# Patient Record
Sex: MALE | Race: WHITE | Employment: UNEMPLOYED | URBAN - METROPOLITAN AREA
[De-identification: names, ages, dates, MRNs, and addresses within clinical notes are randomized per-mention and may not be internally consistent; named-entity substitution may affect disease eponyms.]

---

## 2017-01-05 ENCOUNTER — OFFICE VISIT (OUTPATIENT)
Dept: FAMILY MEDICINE CLINIC | Age: 32
End: 2017-01-05

## 2017-01-05 VITALS
SYSTOLIC BLOOD PRESSURE: 140 MMHG | BODY MASS INDEX: 23.03 KG/M2 | OXYGEN SATURATION: 100 % | DIASTOLIC BLOOD PRESSURE: 88 MMHG | WEIGHT: 170 LBS | HEIGHT: 72 IN | RESPIRATION RATE: 18 BRPM | HEART RATE: 95 BPM | TEMPERATURE: 97.6 F

## 2017-01-05 DIAGNOSIS — K21.00 GASTROESOPHAGEAL REFLUX DISEASE WITH ESOPHAGITIS: Primary | ICD-10-CM

## 2017-01-05 DIAGNOSIS — J01.00 ACUTE NON-RECURRENT MAXILLARY SINUSITIS: ICD-10-CM

## 2017-01-05 DIAGNOSIS — J40 BRONCHITIS: ICD-10-CM

## 2017-01-05 DIAGNOSIS — K52.9 COLITIS: ICD-10-CM

## 2017-01-05 RX ORDER — ALBUTEROL SULFATE 90 UG/1
2 AEROSOL, METERED RESPIRATORY (INHALATION)
Qty: 1 INHALER | Refills: 0 | Status: SHIPPED | OUTPATIENT
Start: 2017-01-05 | End: 2022-08-30

## 2017-01-05 RX ORDER — AZITHROMYCIN 250 MG/1
TABLET, FILM COATED ORAL
Qty: 6 TAB | Refills: 0 | Status: SHIPPED | OUTPATIENT
Start: 2017-01-05 | End: 2017-04-11 | Stop reason: ALTCHOICE

## 2017-01-05 RX ORDER — ALBUTEROL SULFATE 0.83 MG/ML
2.5 SOLUTION RESPIRATORY (INHALATION) ONCE
Qty: 1 EACH | Refills: 0
Start: 2017-01-05 | End: 2017-01-05

## 2017-01-05 RX ORDER — PANTOPRAZOLE SODIUM 40 MG/1
40 TABLET, DELAYED RELEASE ORAL 2 TIMES DAILY
Qty: 60 TAB | Refills: 0 | Status: SHIPPED | OUTPATIENT
Start: 2017-01-05 | End: 2021-11-01 | Stop reason: ALTCHOICE

## 2017-01-05 RX ORDER — RANITIDINE 300 MG/1
300 TABLET ORAL DAILY
Qty: 30 TAB | Refills: 5 | Status: SHIPPED | OUTPATIENT
Start: 2017-01-05 | End: 2021-11-01 | Stop reason: ALTCHOICE

## 2017-01-05 NOTE — PROGRESS NOTES
HISTORY OF PRESENT ILLNESS  Brennan Diego is a 28 y.o. male. Chief Complaint   Patient presents with   [facility] ED Follow-up     Admission to [facility] from 12-19-16 - 12-22-16 DX:COLITIS    Medication Refill       HPI  Pt here for f/u of hospital admission at [facility] from 12/19/16- 12/22/16. He presented with abd pain and vomiting. Sx did resolve shortly after admission. EGD revealed grade D esphagitis. Pt had been on bid ppi. He was told to start carafate, cont ppi, and f/u with gi. He will need repeat egd in one month. Pt did get the carafate. He has been taking it qac and qhs. He is having less abd pain with this. He did complete the cipro and flagyl already. Pt also dx with colitis during this admission. Pt reports that he has been feeling poorly lately. He has had a cough that is productive of yellow and green mucus that is blood tinged at times. He has had intermittent fevers and chills. He has had frontal ha's as well as sore throat. No ear pain. Review of Systems   Constitutional: Positive for chills and fever. HENT: Positive for congestion and sore throat. Respiratory: Positive for cough, sputum production and shortness of breath. Negative for wheezing. Cardiovascular: Negative. Neurological: Positive for headaches. All other systems reviewed and are negative. Physical Exam   Constitutional: He is oriented to person, place, and time. He appears well-developed and well-nourished. No distress. HENT:   Head: Normocephalic and atraumatic. Right Ear: Hearing, tympanic membrane, external ear and ear canal normal.   Left Ear: Hearing, tympanic membrane, external ear and ear canal normal.   Nose: Mucosal edema present. No rhinorrhea. Right sinus exhibits maxillary sinus tenderness. Right sinus exhibits no frontal sinus tenderness. Left sinus exhibits maxillary sinus tenderness. Left sinus exhibits no frontal sinus tenderness.    Mouth/Throat: Uvula is midline, oropharynx is clear and moist and mucous membranes are normal. No oropharyngeal exudate, posterior oropharyngeal edema or posterior oropharyngeal erythema. Eyes: Conjunctivae and EOM are normal.   Neck: Normal range of motion. Neck supple. No JVD present. No thyromegaly present. Cardiovascular: Normal rate, regular rhythm and normal heart sounds. Exam reveals no gallop and no friction rub. No murmur heard. Pulmonary/Chest: Effort normal. No accessory muscle usage. No respiratory distress. He has decreased breath sounds. He has no wheezes. He has no rhonchi. He has no rales. Musculoskeletal: Normal range of motion. Lymphadenopathy:     He has no cervical adenopathy. Neurological: He is alert and oriented to person, place, and time. Coordination normal.   Skin: Skin is warm and dry. Scattered excoriations on face   Psychiatric: He has a normal mood and affect. His behavior is normal. Judgment and thought content normal.   Nursing note and vitals reviewed. ASSESSMENT and Troy Pierre was seen today for ed follow-up and medication refill. Diagnoses and all orders for this visit:    Gastroesophageal reflux disease with esophagitis  -     pantoprazole (PROTONIX) 40 mg tablet; Take 1 Tab by mouth two (2) times a day. -     raNITIdine (ZANTAC) 300 mg tablet; Take 1 Tab by mouth daily.  -     REFERRAL TO GASTROENTEROLOGY  Cont carafate. Colitis  -     pantoprazole (PROTONIX) 40 mg tablet; Take 1 Tab by mouth two (2) times a day. -     raNITIdine (ZANTAC) 300 mg tablet; Take 1 Tab by mouth daily.  -     REFERRAL TO GASTROENTEROLOGY    Bronchitis  -     albuterol (PROVENTIL VENTOLIN) 2.5 mg /3 mL (0.083 %) nebulizer solution; 3 mL by Nebulization route once for 1 dose. -     ALBUTEROL, INHAL. OPI.()  -     INHAL RX, AIRWAY OBST/DX SPUTUM INDUCT ([id number]); Future  -     azithromycin (ZITHROMAX) 250 mg tablet;  Take 2 pills today, then one pill daily on days 2-5.  -     albuterol (PROVENTIL HFA, VENTOLIN HFA, PROAIR HFA) 90 mcg/actuation inhaler; Take 2 Puffs by inhalation every four (4) hours as needed for Wheezing. Acute non-recurrent maxillary sinusitis  -     azithromycin (ZITHROMAX) 250 mg tablet; Take 2 pills today, then one pill daily on days 2-5.       Follow-up Disposition:1 month; sooner prn

## 2017-01-05 NOTE — PROGRESS NOTES
Umair Paula 28 y.o. male   Chief Complaint   Patient presents with   [facility] ED Follow-up     Admission to [facility] from 12-19-16 - 12-22-16 DX:COLITIS    Medication Refill           1. Have you been to the ER, urgent care clinic since your last visit? Hospitalized since your last visit? Yes When: 12-19-16 THRU 12-22-16 Where: [facility] Reason for visit: COLITIS    2. Have you seen or consulted any other health care providers outside of the [facility] since your last visit? Include any pap smears or colon screening.  No

## 2017-04-11 ENCOUNTER — OFFICE VISIT (OUTPATIENT)
Dept: FAMILY MEDICINE CLINIC | Age: 32
End: 2017-04-11

## 2017-04-11 VITALS
HEART RATE: 103 BPM | OXYGEN SATURATION: 95 % | WEIGHT: 171 LBS | BODY MASS INDEX: 23.16 KG/M2 | SYSTOLIC BLOOD PRESSURE: 152 MMHG | HEIGHT: 72 IN | DIASTOLIC BLOOD PRESSURE: 102 MMHG | TEMPERATURE: 98.3 F

## 2017-04-11 DIAGNOSIS — I10 ESSENTIAL HYPERTENSION: ICD-10-CM

## 2017-04-11 DIAGNOSIS — G89.4 CHRONIC PAIN SYNDROME: ICD-10-CM

## 2017-04-11 DIAGNOSIS — M25.552 HIP PAIN, LEFT: Primary | ICD-10-CM

## 2017-04-11 RX ORDER — METOPROLOL SUCCINATE 100 MG/1
100 TABLET, EXTENDED RELEASE ORAL DAILY
Qty: 30 TAB | Refills: 5 | Status: SHIPPED | OUTPATIENT
Start: 2017-04-11 | End: 2021-11-01

## 2017-04-11 NOTE — MR AVS SNAPSHOT
Visit Information Date & Time Provider Department Dept. Phone Encounter #  
 4/11/2017  1:15 PM Yudelka Yanes NP [address] [id number] Follow-up Instructions Return in about 2 weeks (around 4/27/2017), or if symptoms worsen or fail to improve, for hip pain, bruising, blood pressure. Upcoming Health Maintenance Date Due Pneumococcal 19-64 Medium Risk (1 of 1 - PPSV23) 5/9/2005 DTaP/Tdap/Td series (1 - Tdap) 5/9/2007 Allergies as of 4/11/2017  Review Complete On: 4/11/2017 By: Yudelka Yanes NP Severity Noted Reaction Type Reactions Amoxicillin  03/25/2015    Other (comments) Demerol [Meperidine]  09/19/2013    Hives, Swelling Hydrocodone-acetaminophen  03/31/2015    Nausea Only Ketorolac  03/31/2015    Nausea Only Pcn [Penicillins]  09/19/2013    Hives Sulfa (Sulfonamide Antibiotics)  09/19/2013    Hives Tramadol  03/25/2015    Other (comments) Current Immunizations  Never Reviewed Name Date Influenza Vaccine 9/24/2016, 9/30/2012 Not reviewed this visit You Were Diagnosed With   
  
 Codes Comments Hip pain, left    -  Primary ICD-10-CM: M25.552 ICD-9-CM: 719.45 Essential hypertension     ICD-10-CM: I10 
ICD-9-CM: 401.9 Chronic pain syndrome     ICD-10-CM: G89.4 ICD-9-CM: 338. 4 Vitals BP Pulse Temp Height(growth percentile) Weight(growth percentile) SpO2  
 (!) 152/102 (BP 1 Location: Left arm, BP Patient Position: Sitting) (!) 103 98.3 °F (36.8 °C) (Oral) 6' (1.829 m) 171 lb (77.6 kg) 95% BMI Smoking Status 23.19 kg/m2 Current Every Day Smoker Vitals History BMI and BSA Data Body Mass Index Body Surface Area  
 23.19 kg/m 2 1.99 m 2 Preferred Pharmacy Pharmacy Name Phone [facility] [address] [phone] Your Updated Medication List  
  
   
 This list is accurate as of: 4/11/17  2:12 PM.  Always use your most recent med list.  
  
  
  
  
 albuterol 90 mcg/actuation inhaler Commonly known as:  PROVENTIL HFA, VENTOLIN HFA, PROAIR HFA Take 2 Puffs by inhalation every four (4) hours as needed for Wheezing. CARAFATE 100 mg/mL suspension Generic drug:  sucralfate Take  by mouth four (4) times daily. Take 10 mL by Mouth 4 Times a Day Before Meals & at Bedtime. Cholecalciferol (Vitamin D3) 2,000 unit Cap capsule Commonly known as:  VITAMIN D3 Take 2,000 Units by mouth daily. cyclobenzaprine 10 mg tablet Commonly known as:  FLEXERIL  
  
 fluticasone 50 mcg/actuation nasal spray Commonly known as:  Brad Noss 2 Sprays by Both Nostrils route daily. LYRICA 75 mg capsule Generic drug:  pregabalin  
  
 metoprolol succinate 100 mg tablet Commonly known as:  TOPROL-XL Take 1 Tab by mouth daily. * oxyCODONE ER 20 mg ER tablet Commonly known as:  OxyCONTIN  
  
 * oxyCODONE IR 15 mg immediate release tablet Commonly known as:  OXY-IR  
  
 pantoprazole 40 mg tablet Commonly known as:  PROTONIX Take 1 Tab by mouth two (2) times a day. raNITIdine 300 mg tablet Commonly known as:  ZANTAC Take 1 Tab by mouth daily. ZyrTEC 10 mg tablet Generic drug:  cetirizine Take  by mouth daily. * Notice: This list has 2 medication(s) that are the same as other medications prescribed for you. Read the directions carefully, and ask your doctor or other care provider to review them with you. Prescriptions Sent to Pharmacy Refills  
 metoprolol succinate (TOPROL-XL) 100 mg tablet 5 Sig: Take 1 Tab by mouth daily. Class: Normal  
 Pharmacy: [address] [place] [address], [address] [place] [address] #: [phone] Route: Oral  
  
We Performed the Following REFERRAL TO ORTHOPEDICS [[id number] Custom] Comments: Please evaluate and treat patient for chronic hip pain (two months). Patient under pain management for back pain. Follow-up Instructions Return in about 2 weeks (around 4/27/2017), or if symptoms worsen or fail to improve, for hip pain, bruising, blood pressure. Referral Information Referral ID Referred By Referred To  
  
 [id number] Julee Spencer, [address] [facility] [address]   
   [address], [address] Phone: [phone] Fax: [phone] Visits Status Start Date End Date 1 New Request 4/11/17 4/11/18 If your referral has a status of pending review or denied, additional information will be sent to support the outcome of this decision. Patient Instructions Hip Pain: Care Instructions Your Care Instructions Hip pain may be caused by many things, including overuse, a fall, or a twisting movement. Another cause of hip pain is arthritis. Your pain may increase when you stand up, walk, or squat. The pain may come and go or may be constant. Home treatment can help relieve hip pain, swelling, and stiffness. If your pain is ongoing, you may need more tests and treatment. Follow-up care is a key part of your treatment and safety. Be sure to make and go to all appointments, and call your doctor if you are having problems. Its also a good idea to know your test results and keep a list of the medicines you take. How can you care for yourself at home? · Take pain medicines exactly as directed. ¨ If the doctor gave you a prescription medicine for pain, take it as prescribed. ¨ If you are not taking a prescription pain medicine, ask your doctor if you can take an over-the-counter medicine. · Rest and protect your hip. Take a break from any activity, including standing or walking, that may cause pain. · Put ice or a cold pack against your hip for 10 to 20 minutes at a time. Try to do this every 1 to 2 hours for the next 3 days (when you are awake) or until the swelling goes down. Put a thin cloth between the ice and your skin. · Sleep on your healthy side with a pillow between your knees, or sleep on your back with pillows under your knees. · If there is no swelling, you can put moist heat, a heating pad, or a warm cloth on your hip. Do gentle stretching exercises to help keep your hip flexible. · Learn how to prevent falls. Have your vision and hearing checked regularly. Wear slippers or shoes with a nonskid sole. · Stay at a healthy weight. · Wear comfortable shoes. When should you call for help? Call 911 anytime you think you may need emergency care. For example, call if: 
· You have sudden chest pain and shortness of breath, or you cough up blood. · You are not able to stand or walk or bear weight. · Your buttocks, legs, or feet feel numb or tingly. · Your leg or foot is cool or pale or changes color. · You have severe pain. Call your doctor now or seek immediate medical care if: 
· You have signs of infection, such as: 
¨ Increased pain, swelling, warmth, or redness in the hip area. ¨ Red streaks leading from the hip area. ¨ Pus draining from the hip area. ¨ A fever. · You have signs of a blood clot, such as: 
¨ Pain in your calf, back of the knee, thigh, or groin. ¨ Redness and swelling in your leg or groin. · You are not able to bend, straighten, or move your leg normally. · You have trouble urinating or having bowel movements. Watch closely for changes in your health, and be sure to contact your doctor if: 
· You do not get better as expected. Where can you learn more? Go to http://roosevelt-angelica.info/. Enter U270 in the search box to learn more about \"Hip Pain: Care Instructions. \" Current as of: May 27, 2016 Content Version: 11.2 © 8234-7404 [employer].  Care instructions adapted under license by [address] (which disclaims liability or warranty for this information). If you have questions about a medical condition or this instruction, always ask your healthcare professional. [address] any warranty or liability for your use of this information. Introducing [facility]! Dina Marino introduces [employer] patient portal. Now you can access parts of your medical record, email your doctor's office, and request medication refills online. 1. In your internet browser, go to [email or web address] 2. Click on the First Time User? Click Here link in the Sign In box. You will see the New Member Sign Up page. 3. Enter your [employer] Access Code exactly as it appears below. You will not need to use this code after youve completed the sign-up process. If you do not sign up before the expiration date, you must request a new code. · [employer] Access Code: [email or web address] Expires: 7/10/2017 12:55 PM 
 
4. Enter the last four digits of your Social Security Number (xxxx) and Date of Birth (mm/dd/yyyy) as indicated and click Submit. You will be taken to the next sign-up page. 5. Create a [employer] ID. This will be your [employer] login ID and cannot be changed, so think of one that is secure and easy to remember. 6. Create a [employer] password. You can change your password at any time. 7. Enter your Password Reset Question and Answer. This can be used at a later time if you forget your password. 8. Enter your e-mail address. You will receive e-mail notification when new information is available in [address] 9. Click Sign Up. You can now view and download portions of your medical record. 10. Click the Download Summary menu link to download a portable copy of your medical information. If you have questions, please visit the Frequently Asked Questions section of the [employer] website.  Remember, [employer] is NOT to be used for urgent needs. For medical emergencies, dial 911. Now available from your iPhone and Android! Please provide this summary of care documentation to your next provider. Your primary care clinician is listed as Ernesto Barlow. If you have any questions after today's visit, please call [phone].

## 2017-04-11 NOTE — PROGRESS NOTES
HPI  Junaid Zapata is a 28 y.o. male  Chief Complaint   Patient presents with    Other     Pt has brusing and tenderness of left hip and lower back. Pt states that he has been having pain for over 2 months. Pt states that he did not mitali anything to injure himself. Requesting blood pressure medication refill as he is out and has been out for two weeks. Denies chest pain and or shortness of breath. Denies dizziness. Rates hernandez pain on left side 6/10. Denies injury or trauma. Reports pain and bruising has been present for two months. Reports pain is daily but occurs inconsistently. Reports bruising has been red without change. Belt resting at site of bruising. Denies hurting or harming himself. Denies anyone else has hurt or harmed him. Reports normal ambulation with cane which has been unchanged since noticing left hip bruising. Reports sitting increases pain as this has been the case with his pain since his back injury. Reports being under pain management for chronic pain due to severe back injury but states he does not see them till the end of the month. Reports he has pain medication from pain management but states it does not help with the break thru pain. Past Medical History  Past Medical History:   Diagnosis Date    Acid indigestion     ADHD (attention deficit hyperactivity disorder)     Asthma     Back pain     Heartburn     Herpes     HTN (hypertension)     IBS (irritable bowel syndrome)     Joint pain     Muscle pain     Muscle weakness     Trouble in sleeping        Surgical History  Past Surgical History:   Procedure Laterality Date    HX BACK SURGERY  2013    HX ORTHOPAEDIC      RT HAND        Medications  Current Outpatient Prescriptions   Medication Sig Dispense Refill    metoprolol succinate (TOPROL-XL) 100 mg tablet Take 1 Tab by mouth daily. 30 Tab 5    pantoprazole (PROTONIX) 40 mg tablet Take 1 Tab by mouth two (2) times a day.  60 Tab 0    raNITIdine (ZANTAC) 300 mg tablet Take 1 Tab by mouth daily. 30 Tab 5    albuterol (PROVENTIL HFA, VENTOLIN HFA, PROAIR HFA) 90 mcg/actuation inhaler Take 2 Puffs by inhalation every four (4) hours as needed for Wheezing. 1 Inhaler 0    sucralfate (CARAFATE) 100 mg/mL suspension Take  by mouth four (4) times daily. Take 10 mL by Mouth 4 Times a Day Before Meals & at Bedtime.  Cholecalciferol, Vitamin D3, (VITAMIN D3) 2,000 unit cap capsule Take 2,000 Units by mouth daily. 30 Cap 0    LYRICA 75 mg capsule   0    oxyCODONE IR (OXY-IR) 15 mg immediate release tablet       oxyCODONE ER (OXYCONTIN) 20 mg ER tablet   0    cyclobenzaprine (FLEXERIL) 10 mg tablet   0    cetirizine (ZYRTEC) 10 mg tablet Take  by mouth daily.  fluticasone (FLONASE) 50 mcg/actuation nasal spray 2 Sprays by Both Nostrils route daily. 1 Bottle 5       Allergies  Allergies   Allergen Reactions    Amoxicillin Other (comments)    Demerol [Meperidine] Hives and Swelling    Hydrocodone-Acetaminophen Nausea Only    Ketorolac Nausea Only    Pcn [Penicillins] Hives    Sulfa (Sulfonamide Antibiotics) Hives    Tramadol Other (comments)       Family History  Family History   Problem Relation Age of Onset    Cancer Other      GRANDMOTHER, AND AUNT    Diabetes Other      GREAT GRANDFATHER    Heart Disease Other      AUNT    Hypertension Other      GRANDMOTHER, GRANDFATHER, MOTHER    Stroke Other     Immunodeficiency Other      ALL FAMILY MEMBERS       Social History  Social History     Social History    Marital status: SINGLE     Spouse name: N/A    Number of children: N/A    Years of education: N/A     Occupational History    Not on file.      Social History Main Topics    Smoking status: Current Every Day Smoker     Packs/day: 0.50     Years: 13.00     Types: Cigarettes    Smokeless tobacco: Never Used    Alcohol use 1.0 oz/week     2 Standard drinks or equivalent per week      Comment: rare    Drug use: Yes     Special: Marijuana, Prescription, OTC    Sexual activity: No     Other Topics Concern    Not on file     Social History Narrative       Problem List  Patient Active Problem List   Diagnosis Code    LBP (low back pain) M54.5    Encounter for long-term (current) use of other medications Z79.899    Radiculitis M54.10    Intervertebral disc extrusion [id number]    ADHD (attention deficit hyperactivity disorder) F90.9    Marijuana use F12.10       Review of Systems  Review of Systems   Constitutional: Positive for diaphoresis. Negative for chills and fever. Respiratory: Negative for shortness of breath. Cardiovascular: Negative for chest pain and palpitations. Gastrointestinal: Negative for abdominal pain, nausea and vomiting. Musculoskeletal: Positive for back pain, joint pain and myalgias. Negative for falls. Skin: Negative for itching. Neurological: Negative for dizziness. Psychiatric/Behavioral: Negative for substance abuse. Vital Signs  Vitals:    04/11/17 1325 04/11/17 1329   BP: (!) 172/100 (!) 152/102   Pulse: (!) 103    Temp: 98.3 °F (36.8 °C)    TempSrc: Oral    SpO2: 95%    Weight: 171 lb (77.6 kg)    Height: 6' (1.829 m)    PainSc:   6    PainLoc: Hip      PHQ 2 / 9, over the last two weeks 4/11/2017   Little interest or pleasure in doing things Not at all   Feeling down, depressed or hopeless Not at all   Total Score PHQ 2 0     Physical Exam  Physical Exam   Constitutional: He is oriented to person, place, and time. HENT:   Mouth/Throat: Oropharynx is clear and moist.   Cardiovascular: Regular rhythm and normal heart sounds. Tachycardia present. Pulmonary/Chest: Effort normal and breath sounds normal.   Musculoskeletal: He exhibits edema and tenderness. Red splotchy bruising on right side just above hip. Area swollen, tender to touch and blanchable. No obvious deformity to hip. Asymmetrical lean and ambulation with favor to right side due to prior chronic injury.  Able to bear weight with no pain to left side. Neurological: He is alert and oriented to person, place, and time. Psychiatric: He has a normal mood and affect. Diagnostics  Orders Placed This Encounter    REFERRAL TO ORTHOPEDICS     Referral Priority:   Routine     Referral Type:   Consultation     Referral Reason:   Specialty Services Required     Referred to Provider:   Viola Kocher, MD    metoprolol succinate (TOPROL-XL) 100 mg tablet     Sig: Take 1 Tab by mouth daily. Dispense:  30 Tab     Refill:  5       Results  Results for orders placed or performed in visit on 03/31/15   AMB POC URINALYSIS DIP STICK AUTO W/O MICRO   Result Value Ref Range    Color (UA POC) Yellow     Clarity (UA POC) Clear     Glucose (UA POC) Negative Negative    Bilirubin (UA POC) Negative Negative    Ketones (UA POC) Negative Negative    Specific gravity (UA POC) 1.025 1.001 - 1.035    Blood (UA POC) Negative Negative    pH (UA POC) 6.0 4.6 - 8.0    Protein (UA POC) Negative Negative mg/dL    Urobilinogen (UA POC) 0.2 mg/dL 0.2 - 1    Nitrites (UA POC) Negative Negative    Leukocyte esterase (UA POC) Negative Negative       Assessment and Plan  Araseli Hernández was seen today for other. Diagnoses and all orders for this visit:    Hip pain, left  -     REFERRAL TO ORTHOPEDICS    Essential hypertension  -     metoprolol succinate (TOPROL-XL) 100 mg tablet; Take 1 Tab by mouth daily. Chronic pain syndrome  -     REFERRAL TO ORTHOPEDICS      Informed him that I could not give him anything for pain due to being pain management. Discussed the importance of him taking blood pressure medication. After care summary printed and reviewed with patient. Plan reviewed with patient. Questions answered. Patient verbalized understanding of plan and is in agreement with plan. Patient to follow up as needed with Dr. Sandra Souza in two weeks or earlier if symptoms worsen or do not improve. Blood pressure and medications will also be re-evaluated at that visit.     Meri Clemente Tere Mack, FNP-C

## 2017-04-11 NOTE — PROGRESS NOTES
1. Have you been to the ER, urgent care clinic since your last visit? Hospitalized since your last visit? No    2. Have you seen or consulted any other health care providers outside of the [address] [place] [address] since your last visit? Include any pap smears or colon screening. Yes Coral Silva    Is someone accompanying this pt? no    Is the patient using any DME equipment during OV? no      Chief Complaint   Patient presents with    Other     Pt has brusing and tenderness of left hip and lower back. Pt states that he has been having pain for over 2 months. Pt states that he did not mitali anything to injure himself.

## 2017-04-11 NOTE — PATIENT INSTRUCTIONS
Hip Pain: Care Instructions  Your Care Instructions  Hip pain may be caused by many things, including overuse, a fall, or a twisting movement. Another cause of hip pain is arthritis. Your pain may increase when you stand up, walk, or squat. The pain may come and go or may be constant. Home treatment can help relieve hip pain, swelling, and stiffness. If your pain is ongoing, you may need more tests and treatment. Follow-up care is a key part of your treatment and safety. Be sure to make and go to all appointments, and call your doctor if you are having problems. Its also a good idea to know your test results and keep a list of the medicines you take. How can you care for yourself at home? · Take pain medicines exactly as directed. ¨ If the doctor gave you a prescription medicine for pain, take it as prescribed. ¨ If you are not taking a prescription pain medicine, ask your doctor if you can take an over-the-counter medicine. · Rest and protect your hip. Take a break from any activity, including standing or walking, that may cause pain. · Put ice or a cold pack against your hip for 10 to 20 minutes at a time. Try to do this every 1 to 2 hours for the next 3 days (when you are awake) or until the swelling goes down. Put a thin cloth between the ice and your skin. · Sleep on your healthy side with a pillow between your knees, or sleep on your back with pillows under your knees. · If there is no swelling, you can put moist heat, a heating pad, or a warm cloth on your hip. Do gentle stretching exercises to help keep your hip flexible. · Learn how to prevent falls. Have your vision and hearing checked regularly. Wear slippers or shoes with a nonskid sole. · Stay at a healthy weight. · Wear comfortable shoes. When should you call for help? Call 911 anytime you think you may need emergency care. For example, call if:  · You have sudden chest pain and shortness of breath, or you cough up blood.   · You are not able to stand or walk or bear weight. · Your buttocks, legs, or feet feel numb or tingly. · Your leg or foot is cool or pale or changes color. · You have severe pain. Call your doctor now or seek immediate medical care if:  · You have signs of infection, such as:  ¨ Increased pain, swelling, warmth, or redness in the hip area. ¨ Red streaks leading from the hip area. ¨ Pus draining from the hip area. ¨ A fever. · You have signs of a blood clot, such as:  ¨ Pain in your calf, back of the knee, thigh, or groin. ¨ Redness and swelling in your leg or groin. · You are not able to bend, straighten, or move your leg normally. · You have trouble urinating or having bowel movements. Watch closely for changes in your health, and be sure to contact your doctor if:  · You do not get better as expected. Where can you learn more? Go to http://roosevelt-angelica.info/. Enter W264 in the search box to learn more about \"Hip Pain: Care Instructions. \"  Current as of: May 27, 2016  Content Version: 11.2  © 1888-5322 [employer]. Care instructions adapted under license by [employer] (which disclaims liability or warranty for this information). If you have questions about a medical condition or this instruction, always ask your healthcare professional. [place] [address] any warranty or liability for your use of this information.

## 2017-04-12 ENCOUNTER — TELEPHONE (OUTPATIENT)
Dept: FAMILY MEDICINE CLINIC | Age: 32
End: 2017-04-12

## 2017-04-14 DIAGNOSIS — T14.8XXA BRUISING: Primary | ICD-10-CM

## 2017-04-14 DIAGNOSIS — T14.8XXA BRUISING: ICD-10-CM

## 2017-04-18 ENCOUNTER — HOSPITAL ENCOUNTER (OUTPATIENT)
Dept: GENERAL RADIOLOGY | Age: 32
Discharge: HOME | End: 2017-04-18
Payer: MEDICAID

## 2017-04-18 DIAGNOSIS — T14.8XXA BRUISING: ICD-10-CM

## 2017-04-18 PROCEDURE — 74022 RADEX COMPL AQT ABD SERIES: CPT

## 2017-04-19 ENCOUNTER — OFFICE VISIT (OUTPATIENT)
Dept: FAMILY MEDICINE CLINIC | Age: 32
End: 2017-04-19

## 2017-04-19 VITALS
SYSTOLIC BLOOD PRESSURE: 138 MMHG | HEART RATE: 87 BPM | OXYGEN SATURATION: 99 % | WEIGHT: 172 LBS | RESPIRATION RATE: 17 BRPM | DIASTOLIC BLOOD PRESSURE: 94 MMHG | BODY MASS INDEX: 23.3 KG/M2 | HEIGHT: 72 IN | TEMPERATURE: 97.6 F

## 2017-04-19 DIAGNOSIS — R23.3 ABNORMAL BRUISING: Primary | ICD-10-CM

## 2017-04-19 DIAGNOSIS — R10.9 ABDOMINAL WALL PAIN IN LEFT FLANK: ICD-10-CM

## 2017-04-19 DIAGNOSIS — R23.3 ABNORMAL BRUISING: ICD-10-CM

## 2017-04-19 NOTE — PATIENT INSTRUCTIONS
Please contact our office if you have any questions about your visit today. 1.) Continue to use Pain Medication as needed. 2.) We will call with information regarding the labwork.     3.) Please keep follow up appointment on May 1st.

## 2017-04-19 NOTE — PROGRESS NOTES
Progress Note    Patient: Nestor Pinon MRN: [id number]  SSN: [id number]    YOB: 1985  Age: 28 y.o. Sex: male          Subjective:   Nestor Pinon is a 28 y.o. male who is here to review his x-ray results and discuss his left flank pain. The patient mentions that he has had this pain off and on for a number of months. It was just a few days ago when he developed bruising on the left side of his thorax. He denies any trauma to the region  And denies any history of bleeding or clotting disorders. He denies hemophilia and also any other blood conditions. He denies using blood thinners either. He describes the pain primarily around his left hip region. He does continue to take his pain medications from his pain management specialist.         Objective:     Past Medical History:   Diagnosis Date    Acid indigestion     ADHD (attention deficit hyperactivity disorder)     Asthma     Back pain     Heartburn     Herpes     HTN (hypertension)     IBS (irritable bowel syndrome)     Joint pain     Muscle pain     Muscle weakness     Trouble in sleeping         Vitals:    04/19/17 1519 04/19/17 1525   BP: (!) 158/100 (!) 138/94   Pulse: 87    Resp: 17    Temp: 97.6 °F (36.4 °C)    TempSrc: Oral    SpO2: 99%    Weight: 172 lb (78 kg)    Height: 6' (1.829 m)             Review of Systems:  Pertinent items are noted in the History of Present Illness. Physical Exam:   GENERAL: alert, cooperative, no distress, appears stated age  LYMPHATIC: Cervical, supraclavicular, and axillary nodes normal.   THROAT & NECK: normal and no erythema or exudates noted. LUNG: clear to auscultation bilaterally  HEART: regular rate and rhythm, S1, S2 normal, no murmur, click, rub or gallop  ABDOMEN: soft, non-tender. Bowel sounds normal. No masses,  no organomegaly  EXTREMITIES:  extremities normal, atraumatic, no cyanosis or edema  SKIN: Left flank bruising with extends to upper pelvis.  The bruising is almost in a reticular pattern. No discharge from the area. Lab/Data Review:      XR Results (most recent):    Results from Hospital Encounter encounter on 04/18/17   XR ABD ACUTE W 1 V CHEST   Narrative Acute abdomen series    HISTORY: Abdominal pain. Comparison: No priors. FINDINGS:      Chest x-ray, Supine and upright views of the abdomen were obtained. Chest demonstrate a normal cardiac silhouette and hilar shadows. The lungs are  clear. Costophrenic angles are sharp. There is no subdiaphragmatic free air. Scatter bowel gas was seen in the small  bowel and colon without evidence for abnormal bowel dilatation. Moderate amount  of stool is seen throughout the colon. There is no suspicious calcifications. Bony structures appear intact. Impression IMPRESSION:  1. No acute findings in the chest.  2. Moderate amount of stool is seen throughout the colon. 3. No abnormal bowel dilatation. Assessment:     1.) Abnormal Skin Bruising: Unusual. Will look into pancreatitis, coagulopathies and also pancreatic cancer with lab work. 2.) Left Flank and Abdominal Pain: I reviewed patient's x-ray and interpreted and summarized it. I will get a CT to rule out possible splenic or pancreatic causes of his symptoms. Patient advised to take his regular pain meds as needed. We will call if there is any abnormal finding on lab work and CT scan. Otherwise patient will continue to keep regularly scheduled appointment. This encounter including interview, exam, evaluation and discussion/ counseling was approximately 25 minutes.         Plan:     Orders Placed This Encounter    CT ABD PELV W WO CONT    ANTIPHOSPHOLIPID SYNDROME PANEL    PROTHROMBIN TIME + INR    AMYLASE    LIPASE    CANCER AG 19-9         Signed By: Holland Hunt DO     April 19, 2017

## 2017-04-19 NOTE — MR AVS SNAPSHOT
Visit Information Date & Time Provider Department Dept. Phone Encounter #  
 4/19/2017  3:00 PM Tanvir Donovan [facility] [phone] [id number] Follow-up Instructions Return for Patient will keep regular appointment . Your Appointments 5/1/2017  1:00 PM  
Follow Up with Tyler Mayberry NP [facility] (--) Appt Note: 2 week fu  
 [address] [facility] [id number] [phone]  
  
   
 [address] [place] [address] Road [address]  
  
    
 5/4/2017  1:15 PM  
New Patient with Taco Gordon MD  
[address] [facility], [address] [facility] ([facility]) Appt Note: left hip pain, chronic pain, ref'd by [facility] practice [address] [facility], [address] [facility]  
[phone] [address] [place] [address], [facility] [address] Upcoming Health Maintenance Date Due Pneumococcal 19-64 Medium Risk (1 of 1 - PPSV23) 5/9/2005 DTaP/Tdap/Td series (1 - Tdap) 5/9/2007 Allergies as of 4/19/2017  Review Complete On: 6/56/0085 By: Naga Gaona. Tremaine Dumont LPN Severity Noted Reaction Type Reactions Amoxicillin  03/25/2015    Other (comments) Demerol [Meperidine]  09/19/2013    Hives, Swelling Hydrocodone-acetaminophen  03/31/2015    Nausea Only Ketorolac  03/31/2015    Nausea Only Pcn [Penicillins]  09/19/2013    Hives Sulfa (Sulfonamide Antibiotics)  09/19/2013    Hives Tramadol  03/25/2015    Other (comments) Current Immunizations  Never Reviewed Name Date Influenza Vaccine 9/24/2016, 9/30/2012 Not reviewed this visit You Were Diagnosed With   
  
 Codes Comments Abnormal bruising    -  Primary ICD-10-CM: R23.8 ICD-9-CM: 817. 9 Abdominal wall pain in left flank     ICD-10-CM: R10.9 ICD-9-CM: 789.09 Vitals BP Pulse Temp Resp Height(growth percentile) Weight(growth percentile) (!) 138/94 87 97.6 °F (36.4 °C) (Oral) 17 6' (1.829 m) 172 lb (78 kg) SpO2 BMI Smoking Status 99% 23.33 kg/m2 Current Every Day Smoker Vitals History BMI and BSA Data Body Mass Index Body Surface Area  
 23.33 kg/m 2 1.99 m 2 Preferred Pharmacy Pharmacy Name Phone [facility] [address] [place] [address], [address] [place] [address] [phone] Your Updated Medication List  
  
   
This list is accurate as of: 4/19/17  4:01 PM.  Always use your most recent med list.  
  
  
  
  
 albuterol 90 mcg/actuation inhaler Commonly known as:  PROVENTIL HFA, VENTOLIN HFA, PROAIR HFA Take 2 Puffs by inhalation every four (4) hours as needed for Wheezing. CARAFATE 100 mg/mL suspension Generic drug:  sucralfate Take  by mouth four (4) times daily. Take 10 mL by Mouth 4 Times a Day Before Meals & at Bedtime. Cholecalciferol (Vitamin D3) 2,000 unit Cap capsule Commonly known as:  VITAMIN D3 Take 2,000 Units by mouth daily. cyclobenzaprine 10 mg tablet Commonly known as:  FLEXERIL  
  
 fluticasone 50 mcg/actuation nasal spray Commonly known as:  Chales Copa 2 Sprays by Both Nostrils route daily. LYRICA 75 mg capsule Generic drug:  pregabalin  
  
 metoprolol succinate 100 mg tablet Commonly known as:  TOPROL-XL Take 1 Tab by mouth daily. * oxyCODONE ER 20 mg ER tablet Commonly known as:  OxyCONTIN  
  
 * oxyCODONE IR 15 mg immediate release tablet Commonly known as:  OXY-IR  
  
 pantoprazole 40 mg tablet Commonly known as:  PROTONIX Take 1 Tab by mouth two (2) times a day. raNITIdine 300 mg tablet Commonly known as:  ZANTAC Take 1 Tab by mouth daily. ZyrTEC 10 mg tablet Generic drug:  cetirizine Take  by mouth daily. * Notice: This list has 2 medication(s) that are the same as other medications prescribed for you.  Read the directions carefully, and ask your doctor or other care provider to review them with you. Follow-up Instructions Return for Patient will keep regular appointment . To-Do List   
 04/19/2017 Lab:  AMYLASE   
  
 04/19/2017 Lab:  ANTIPHOSPHOLIPID SYNDROME PANEL   
  
 04/19/2017 Lab:  CANCER AG 19-9   
  
 04/19/2017 Imaging:  CT ABD PELV W WO CONT   
  
 04/19/2017 Lab:  LIPASE   
  
 04/19/2017 Lab:  PROTHROMBIN TIME + INR Patient Instructions Please contact our office if you have any questions about your visit today. 1.) Continue to use Pain Medication as needed. 2.) We will call with information regarding the labwork. 3.) Please keep follow up appointment on May 1st.  
 
 
 
  
Introducing [facility]! Nannette Saab introduces [employer] patient portal. Now you can access parts of your medical record, email your doctor's office, and request medication refills online. 1. In your internet browser, go to [email or web address] 2. Click on the First Time User? Click Here link in the Sign In box. You will see the New Member Sign Up page. 3. Enter your [employer] Access Code exactly as it appears below. You will not need to use this code after youve completed the sign-up process. If you do not sign up before the expiration date, you must request a new code. · [employer] Access Code: [email or web address] Expires: 7/10/2017 12:55 PM 
 
4. Enter the last four digits of your Social Security Number (xxxx) and Date of Birth (mm/dd/yyyy) as indicated and click Submit. You will be taken to the next sign-up page. 5. Create a WatchParty ID. This will be your WatchParty login ID and cannot be changed, so think of one that is secure and easy to remember. 6. Create a [employer]Party password. You can change your password at any time. 7. Enter your Password Reset Question and Answer. This can be used at a later time if you forget your password. 8. Enter your e-mail address. You will receive e-mail notification when new information is available in [address] 9. Click Sign Up. You can now view and download portions of your medical record. 10. Click the Download Summary menu link to download a portable copy of your medical information. If you have questions, please visit the Frequently Asked Questions section of the [employer] website. Remember, [employer] is NOT to be used for urgent needs. For medical emergencies, dial 911. Now available from your iPhone and Android! Please provide this summary of care documentation to your next provider. Your primary care clinician is listed as Alan Leslie. If you have any questions after today's visit, please call [phone].

## 2017-04-19 NOTE — PROGRESS NOTES
Chief Complaint   Patient presents with    Hip Pain     left hip rates as an 9/10, states he has a lot of popping and clicking when he ambulates    Follow-up     here for xray results     1. Have you been to the ER, urgent care clinic since your last visit? Hospitalized since your last visit? No    2. Have you seen or consulted any other health care providers outside of the [facility] since your last visit? Include any pap smears or colon screening.  No

## 2017-07-13 DIAGNOSIS — T14.8XXA BRUISING: ICD-10-CM

## 2017-07-19 ENCOUNTER — APPOINTMENT (OUTPATIENT)
Dept: GENERAL RADIOLOGY | Age: 32
DRG: 249 | End: 2017-07-19
Attending: EMERGENCY MEDICINE
Payer: MEDICAID

## 2017-07-19 ENCOUNTER — HOSPITAL ENCOUNTER (INPATIENT)
Age: 32
LOS: 2 days | Discharge: HOME | DRG: 249 | End: 2017-07-22
Attending: EMERGENCY MEDICINE | Admitting: INTERNAL MEDICINE
Payer: MEDICAID

## 2017-07-19 ENCOUNTER — APPOINTMENT (OUTPATIENT)
Dept: CT IMAGING | Age: 32
DRG: 249 | End: 2017-07-19
Attending: EMERGENCY MEDICINE
Payer: MEDICAID

## 2017-07-19 DIAGNOSIS — R11.2 INTRACTABLE VOMITING WITH NAUSEA, UNSPECIFIED VOMITING TYPE: Primary | ICD-10-CM

## 2017-07-19 DIAGNOSIS — R03.0 ELEVATED BLOOD PRESSURE READING: ICD-10-CM

## 2017-07-19 DIAGNOSIS — K52.9 COLITIS: ICD-10-CM

## 2017-07-19 DIAGNOSIS — R10.84 ABDOMINAL PAIN, GENERALIZED: ICD-10-CM

## 2017-07-19 LAB
ALBUMIN SERPL BCP-MCNC: 4.4 G/DL (ref 3.4–5)
ALBUMIN/GLOB SERPL: 1.4 {RATIO} (ref 0.8–1.7)
ALP SERPL-CCNC: 78 U/L (ref 45–117)
ALT SERPL-CCNC: 18 U/L (ref 16–61)
ANION GAP BLD CALC-SCNC: 8 MMOL/L (ref 3–18)
APTT PPP: 25.7 SEC (ref 23–36.4)
AST SERPL W P-5'-P-CCNC: 10 U/L (ref 15–37)
BASOPHILS # BLD AUTO: 0 K/UL (ref 0–0.1)
BASOPHILS # BLD: 0 % (ref 0–2)
BILIRUB DIRECT SERPL-MCNC: 0.2 MG/DL (ref 0–0.2)
BILIRUB SERPL-MCNC: 0.6 MG/DL (ref 0.2–1)
BUN SERPL-MCNC: 8 MG/DL (ref 7–18)
BUN/CREAT SERPL: 10 (ref 12–20)
CALCIUM SERPL-MCNC: 8.9 MG/DL (ref 8.5–10.1)
CHLORIDE SERPL-SCNC: 108 MMOL/L (ref 100–108)
CK MB CFR SERPL CALC: ABNORMAL % (ref 0–4)
CK MB SERPL-MCNC: <1 NG/ML (ref 5–25)
CK SERPL-CCNC: 28 U/L (ref 39–308)
CO2 SERPL-SCNC: 25 MMOL/L (ref 21–32)
CREAT SERPL-MCNC: 0.83 MG/DL (ref 0.6–1.3)
DIFFERENTIAL METHOD BLD: ABNORMAL
EOSINOPHIL # BLD: 0 K/UL (ref 0–0.4)
EOSINOPHIL NFR BLD: 0 % (ref 0–5)
ERYTHROCYTE [DISTWIDTH] IN BLOOD BY AUTOMATED COUNT: 12.9 % (ref 11.6–14.5)
GLOBULIN SER CALC-MCNC: 3.2 G/DL (ref 2–4)
GLUCOSE SERPL-MCNC: 134 MG/DL (ref 74–99)
HCT VFR BLD AUTO: 42.7 % (ref 36–48)
HGB BLD-MCNC: 14.7 G/DL (ref 13–16)
INR PPP: 1.2 (ref 0.8–1.2)
LIPASE SERPL-CCNC: 120 U/L (ref 73–393)
LYMPHOCYTES # BLD AUTO: 11 % (ref 21–52)
LYMPHOCYTES # BLD: 1.3 K/UL (ref 0.9–3.6)
MCH RBC QN AUTO: 29.8 PG (ref 24–34)
MCHC RBC AUTO-ENTMCNC: 34.4 G/DL (ref 31–37)
MCV RBC AUTO: 86.6 FL (ref 74–97)
MONOCYTES # BLD: 0.4 K/UL (ref 0.05–1.2)
MONOCYTES NFR BLD AUTO: 3 % (ref 3–10)
NEUTS SEG # BLD: 10.2 K/UL (ref 1.8–8)
NEUTS SEG NFR BLD AUTO: 86 % (ref 40–73)
PLATELET # BLD AUTO: 290 K/UL (ref 135–420)
PMV BLD AUTO: 10.6 FL (ref 9.2–11.8)
POTASSIUM SERPL-SCNC: 3.4 MMOL/L (ref 3.5–5.5)
PROT SERPL-MCNC: 7.6 G/DL (ref 6.4–8.2)
PROTHROMBIN TIME: 14.6 SEC (ref 11.5–15.2)
RBC # BLD AUTO: 4.93 M/UL (ref 4.7–5.5)
SODIUM SERPL-SCNC: 141 MMOL/L (ref 136–145)
TROPONIN I SERPL-MCNC: <0.02 NG/ML (ref 0–0.04)
WBC # BLD AUTO: 12 K/UL (ref 4.6–13.2)

## 2017-07-19 PROCEDURE — 80076 HEPATIC FUNCTION PANEL: CPT | Performed by: EMERGENCY MEDICINE

## 2017-07-19 PROCEDURE — 85610 PROTHROMBIN TIME: CPT | Performed by: EMERGENCY MEDICINE

## 2017-07-19 PROCEDURE — 74177 CT ABD & PELVIS W/CONTRAST: CPT

## 2017-07-19 PROCEDURE — 96361 HYDRATE IV INFUSION ADD-ON: CPT

## 2017-07-19 PROCEDURE — 96376 TX/PRO/DX INJ SAME DRUG ADON: CPT

## 2017-07-19 PROCEDURE — 74022 RADEX COMPL AQT ABD SERIES: CPT

## 2017-07-19 PROCEDURE — 85730 THROMBOPLASTIN TIME PARTIAL: CPT | Performed by: EMERGENCY MEDICINE

## 2017-07-19 PROCEDURE — 83690 ASSAY OF LIPASE: CPT | Performed by: EMERGENCY MEDICINE

## 2017-07-19 PROCEDURE — 80048 BASIC METABOLIC PNL TOTAL CA: CPT | Performed by: EMERGENCY MEDICINE

## 2017-07-19 PROCEDURE — 74011250636 HC RX REV CODE- 250/636: Performed by: EMERGENCY MEDICINE

## 2017-07-19 PROCEDURE — 74011000258 HC RX REV CODE- 258: Performed by: EMERGENCY MEDICINE

## 2017-07-19 PROCEDURE — 96375 TX/PRO/DX INJ NEW DRUG ADDON: CPT

## 2017-07-19 PROCEDURE — 85025 COMPLETE CBC W/AUTO DIFF WBC: CPT | Performed by: EMERGENCY MEDICINE

## 2017-07-19 PROCEDURE — C9113 INJ PANTOPRAZOLE SODIUM, VIA: HCPCS | Performed by: EMERGENCY MEDICINE

## 2017-07-19 PROCEDURE — 96365 THER/PROPH/DIAG IV INF INIT: CPT

## 2017-07-19 PROCEDURE — 82550 ASSAY OF CK (CPK): CPT | Performed by: EMERGENCY MEDICINE

## 2017-07-19 PROCEDURE — 99285 EMERGENCY DEPT VISIT HI MDM: CPT

## 2017-07-19 RX ORDER — PANTOPRAZOLE SODIUM 40 MG/10ML
40 INJECTION, POWDER, LYOPHILIZED, FOR SOLUTION INTRAVENOUS
Status: COMPLETED | OUTPATIENT
Start: 2017-07-19 | End: 2017-07-19

## 2017-07-19 RX ORDER — ONDANSETRON 2 MG/ML
4 INJECTION INTRAMUSCULAR; INTRAVENOUS
Status: COMPLETED | OUTPATIENT
Start: 2017-07-19 | End: 2017-07-19

## 2017-07-19 RX ORDER — CLONIDINE 0.2 MG/24H
1 PATCH, EXTENDED RELEASE TRANSDERMAL
Status: DISPENSED | OUTPATIENT
Start: 2017-07-19 | End: 2017-07-20

## 2017-07-19 RX ORDER — PROMETHAZINE HYDROCHLORIDE 25 MG/1
25 SUPPOSITORY RECTAL
Qty: 12 SUPPOSITORY | Refills: 0 | Status: SHIPPED | OUTPATIENT
Start: 2017-07-19 | End: 2017-07-26

## 2017-07-19 RX ORDER — LORAZEPAM 2 MG/ML
1 INJECTION INTRAMUSCULAR ONCE
Status: COMPLETED | OUTPATIENT
Start: 2017-07-19 | End: 2017-07-19

## 2017-07-19 RX ORDER — MORPHINE SULFATE 4 MG/ML
4 INJECTION, SOLUTION INTRAMUSCULAR; INTRAVENOUS
Status: DISCONTINUED | OUTPATIENT
Start: 2017-07-19 | End: 2017-07-19

## 2017-07-19 RX ORDER — POTASSIUM CHLORIDE 7.45 MG/ML
10 INJECTION INTRAVENOUS
Status: COMPLETED | OUTPATIENT
Start: 2017-07-19 | End: 2017-07-20

## 2017-07-19 RX ORDER — MORPHINE SULFATE 4 MG/ML
4 INJECTION, SOLUTION INTRAMUSCULAR; INTRAVENOUS
Status: COMPLETED | OUTPATIENT
Start: 2017-07-19 | End: 2017-07-19

## 2017-07-19 RX ORDER — DIPHENHYDRAMINE HYDROCHLORIDE 50 MG/ML
25 INJECTION, SOLUTION INTRAMUSCULAR; INTRAVENOUS
Status: COMPLETED | OUTPATIENT
Start: 2017-07-19 | End: 2017-07-19

## 2017-07-19 RX ADMIN — POTASSIUM CHLORIDE 10 MEQ: 7.46 INJECTION, SOLUTION INTRAVENOUS at 23:34

## 2017-07-19 RX ADMIN — DIPHENHYDRAMINE HYDROCHLORIDE 25 MG: 50 INJECTION, SOLUTION INTRAMUSCULAR; INTRAVENOUS at 23:36

## 2017-07-19 RX ADMIN — ONDANSETRON 4 MG: 2 INJECTION INTRAMUSCULAR; INTRAVENOUS at 20:38

## 2017-07-19 RX ADMIN — LORAZEPAM 1 MG: 2 INJECTION INTRAMUSCULAR; INTRAVENOUS at 23:33

## 2017-07-19 RX ADMIN — SODIUM CHLORIDE 1000 ML: 900 INJECTION, SOLUTION INTRAVENOUS at 23:26

## 2017-07-19 RX ADMIN — PANTOPRAZOLE SODIUM 40 MG: 40 INJECTION, POWDER, FOR SOLUTION INTRAVENOUS at 20:38

## 2017-07-19 RX ADMIN — ONDANSETRON 4 MG: 2 INJECTION INTRAMUSCULAR; INTRAVENOUS at 23:05

## 2017-07-19 RX ADMIN — Medication 4 MG: at 20:40

## 2017-07-19 RX ADMIN — SODIUM CHLORIDE 1000 ML: 9 INJECTION, SOLUTION INTRAVENOUS at 19:27

## 2017-07-19 RX ADMIN — SODIUM CHLORIDE 1000 ML: 900 INJECTION, SOLUTION INTRAVENOUS at 20:37

## 2017-07-19 RX ADMIN — ONDANSETRON 4 MG: 2 INJECTION INTRAMUSCULAR; INTRAVENOUS at 19:27

## 2017-07-19 RX ADMIN — PROMETHAZINE HYDROCHLORIDE 25 MG: 25 INJECTION INTRAMUSCULAR; INTRAVENOUS at 23:27

## 2017-07-19 NOTE — IP AVS SNAPSHOT
Jamarryann Ward 
 
 
 [address] Patient: Francia Poole MRN: [id number] You are allergic to the following Allergen Reactions Amoxicillin Other (comments) Demerol (Meperidine) Hives Swelling Hydrocodone-Acetaminophen Nausea Only Ketorolac Nausea Only Pcn (Penicillins) Hives Sulfa (Sulfonamide Antibiotics) Hives Tramadol Other (comments) Recent Documentation Height Weight BMI Smoking Status 1.829 m 70.5 kg 21.08 kg/m2 Current Every Day Smoker Unresulted Labs Order Current Status OVA & PARASITES, STOOL In process CULTURE, STOOL Preliminary result Emergency Contacts Name Discharge Info Relation Home Work Mobile Amanda Mabry  Other Relative [6] [phone] ConstantiaLynda sin  Other Relative [6] [phone] About your hospitalization You were admitted on:  July 20, 2017 You last received care in the:  [facility] [address] You were discharged on:  July 22, 2017 Unit phone number:  [phone] Why you were hospitalized Your primary diagnosis was:  Not on File Your diagnoses also included:  Colitis Providers Seen During Your Hospitalizations Provider Role Specialty Primary office phone Sera Alexandra DO Attending Provider Emergency Medicine [phone] Real Fontenot MD Attending Provider Emergency Medicine [phone] Ernestine Ro MD Attending Provider Internal Medicine [phone] Your Primary Care Physician (PCP) Primary Care Physician Office Phone Office Fax [address], [address] [place] [address] [phone] Follow-up Information Follow up With Details Comments Contact Info Keke Ferrell MD On 7/26/2017 @ 10:15 [address] [place] [id number] [phone] Your Appointments  Wednesday July 26, 2017 10:15 AM EDT  
 TRANSITIONAL CARE MANAGEMENT with Sherif Santacruz MD  
[address] (--)  
 Jeny [facility] [address] [phone] Current Discharge Medication List  
  
START taking these medications Dose & Instructions Dispensing Information Comments Morning Noon Evening Bedtime  
 metoclopramide HCl 10 mg tablet Commonly known as:  REGLAN Your last dose was: Your next dose is:    
   
   
 Dose:  10 mg Take 1 Tab by mouth Before breakfast, lunch, dinner and at bedtime for 10 days. Quantity:  40 Tab Refills:  0  
     
   
   
   
  
 oxyCODONE-acetaminophen 5-325 mg per tablet Commonly known as:  PERCOCET Your last dose was: Your next dose is:    
   
   
 Dose:  1 Tab Take 1 Tab by mouth every four (4) hours as needed. Max Daily Amount: 6 Tabs. Quantity:  10 Tab Refills:  0  
     
   
   
   
  
 * promethazine 25 mg suppository Commonly known as:  PHENERGAN Your last dose was: Your next dose is:    
   
   
 Dose:  25 mg Insert 1 Suppository into rectum every six (6) hours as needed for Nausea for up to 7 days. Quantity:  12 Suppository Refills:  0  
     
   
   
   
  
 * promethazine 25 mg suppository Commonly known as:  PHENERGAN Your last dose was: Your next dose is:    
   
   
 Dose:  25 mg Insert 1 Suppository into rectum every six (6) hours as needed for Nausea for up to 7 days. Quantity:  15 Suppository Refills:  0  
     
   
   
   
  
 * Notice: This list has 2 medication(s) that are the same as other medications prescribed for you. Read the directions carefully, and ask your doctor or other care provider to review them with you. CONTINUE these medications which have CHANGED Dose & Instructions Dispensing Information Comments Morning Noon Evening Bedtime  
 oxyCODONE ER 20 mg ER tablet Commonly known as:  OxyCONTIN  
 What changed:  Another medication with the same name was removed. Continue taking this medication, and follow the directions you see here. Your last dose was: Your next dose is:    
   
   
  Refills:  0  
     
   
   
   
  
 * pantoprazole 40 mg tablet Commonly known as:  PROTONIX What changed:  Another medication with the same name was added. Make sure you understand how and when to take each. Your last dose was: Your next dose is:    
   
   
 Dose:  40 mg Take 1 Tab by mouth two (2) times a day. Quantity:  60 Tab Refills:  0  
     
   
   
   
  
 * pantoprazole 40 mg tablet Commonly known as:  PROTONIX What changed: You were already taking a medication with the same name, and this prescription was added. Make sure you understand how and when to take each. Your last dose was: Your next dose is:    
   
   
 Dose:  40 mg Take 1 Tab by mouth Before breakfast and dinner. Indications: Upper GI Bleed Quantity:  60 Tab Refills:  0  
     
   
   
   
  
 * Notice: This list has 2 medication(s) that are the same as other medications prescribed for you. Read the directions carefully, and ask your doctor or other care provider to review them with you. CONTINUE these medications which have NOT CHANGED Dose & Instructions Dispensing Information Comments Morning Noon Evening Bedtime  
 albuterol 90 mcg/actuation inhaler Commonly known as:  PROVENTIL HFA, VENTOLIN HFA, PROAIR HFA Your last dose was: Your next dose is:    
   
   
 Dose:  2 Puff Take 2 Puffs by inhalation every four (4) hours as needed for Wheezing. Quantity:  1 Inhaler Refills:  0  
     
   
   
   
  
 CARAFATE 100 mg/mL suspension Generic drug:  sucralfate Your last dose was: Your next dose is: Take  by mouth four (4) times daily. Take 10 mL by Mouth 4 Times a Day Before Meals & at Bedtime. Refills:  0 fluticasone 50 mcg/actuation nasal spray Commonly known as:  Tank Keller Your last dose was: Your next dose is:    
   
   
 Dose:  2 Spray 2 Sprays by Both Nostrils route daily. Quantity:  1 Bottle Refills:  5  
     
   
   
   
  
 metoprolol succinate 100 mg tablet Commonly known as:  TOPROL-XL Your last dose was: Your next dose is:    
   
   
 Dose:  100 mg Take 1 Tab by mouth daily. Quantity:  30 Tab Refills:  5  
     
   
   
   
  
 raNITIdine 300 mg tablet Commonly known as:  ZANTAC Your last dose was: Your next dose is:    
   
   
 Dose:  300 mg Take 1 Tab by mouth daily. Quantity:  30 Tab Refills:  5 ZyrTEC 10 mg tablet Generic drug:  cetirizine Your last dose was: Your next dose is: Take  by mouth daily. Refills:  0 ASK your doctor about these medications Dose & Instructions Dispensing Information Comments Morning Noon Evening Bedtime  
 cyclobenzaprine 10 mg tablet Commonly known as:  FLEXERIL Your last dose was: Your next dose is:    
   
   
  Refills:  0 LYRICA 75 mg capsule Generic drug:  pregabalin Your last dose was: Your next dose is:    
   
   
  Refills:  0 Where to Get Your Medications Information on where to get these meds will be given to you by the nurse or doctor. ! Ask your nurse or doctor about these medications  
  metoclopramide HCl 10 mg tablet  
 oxyCODONE-acetaminophen 5-325 mg per tablet  
 pantoprazole 40 mg tablet  
 promethazine 25 mg suppository  
 promethazine 25 mg suppository Discharge Instructions Call your doctor or come to the emergency room, if you have continued vomiting, if you are unable to tolerate any food by mouth, if you notice blood in your vomitus or something that looks like ground coffee or if you have other concerns. Do not take the phenergan suppository around the same time you take reglan and skew the dosing time such that there are at least 6 hours before the time you take one or the other. Abdominal Pain: Care Instructions Your Care Instructions Abdominal pain has many possible causes. Some aren't serious and get better on their own in a few days. Others need more testing and treatment. If your pain continues or gets worse, you need to be rechecked and may need more tests to find out what is wrong. You may need surgery to correct the problem. Don't ignore new symptoms, such as fever, nausea and vomiting, urination problems, pain that gets worse, and dizziness. These may be signs of a more serious problem. Your doctor may have recommended a follow-up visit in the next 8 to 12 hours. If you are not getting better, you may need more tests or treatment. The doctor has checked you carefully, but problems can develop later. If you notice any problems or new symptoms, get medical treatment right away. Follow-up care is a key part of your treatment and safety. Be sure to make and go to all appointments, and call your doctor if you are having problems. It's also a good idea to know your test results and keep a list of the medicines you take. How can you care for yourself at home? · Rest until you feel better. · To prevent dehydration, drink plenty of fluids, enough so that your urine is light yellow or clear like water. Choose water and other caffeine-free clear liquids until you feel better. If you have kidney, heart, or liver disease and have to limit fluids, talk with your doctor before you increase the amount of fluids you drink. · If your stomach is upset, eat mild foods, such as rice, dry toast or crackers, bananas, and applesauce. Try eating several small meals instead of two or three large ones. · Wait until 48 hours after all symptoms have gone away before you have spicy foods, alcohol, and drinks that contain caffeine. · Do not eat foods that are high in fat. · Avoid anti-inflammatory medicines such as aspirin, ibuprofen (Advil, Motrin), and naproxen (Aleve). These can cause stomach upset. Talk to your doctor if you take daily aspirin for another health problem. When should you call for help? Call 911 anytime you think you may need emergency care. For example, call if: 
· You passed out (lost consciousness). · You pass maroon or very bloody stools. · You vomit blood or what looks like coffee grounds. · You have new, severe belly pain. Call your doctor now or seek immediate medical care if: 
· Your pain gets worse, especially if it becomes focused in one area of your belly. · You have a new or higher fever. · Your stools are black and look like tar, or they have streaks of blood. · You have unexpected vaginal bleeding. · You have symptoms of a urinary tract infection. These may include: 
¨ Pain when you urinate. ¨ Urinating more often than usual. 
¨ Blood in your urine. · You are dizzy or lightheaded, or you feel like you may faint. Watch closely for changes in your health, and be sure to contact your doctor if: 
· You are not getting better after 1 day (24 hours). Where can you learn more? Go to http://roosevelt-angelica.info/. Enter O235 in the search box to learn more about \"Abdominal Pain: Care Instructions. \" Current as of: March 20, 2017 Content Version: 11.3 © 8490-3916 [employer]. Care instructions adapted under license by [employer] (which disclaims liability or warranty for this information). If you have questions about a medical condition or this instruction, always ask your healthcare professional. [address] any warranty or liability for your use of this information. Nausea and Vomiting: Care Instructions Your Care Instructions When you are nauseated, you may feel weak and sweaty and notice a lot of saliva in your mouth. Nausea often leads to vomiting. Most of the time you do not need to worry about nausea and vomiting, but they can be signs of other illnesses. Two common causes of nausea and vomiting are stomach flu and food poisoning. Nausea and vomiting from viral stomach flu will usually start to improve within 24 hours. Nausea and vomiting from food poisoning may last from 12 to 48 hours. The doctor has checked you carefully, but problems can develop later. If you notice any problems or new symptoms, get medical treatment right away. Follow-up care is a key part of your treatment and safety. Be sure to make and go to all appointments, and call your doctor if you are having problems. It's also a good idea to know your test results and keep a list of the medicines you take. How can you care for yourself at home? · To prevent dehydration, drink plenty of fluids, enough so that your urine is light yellow or clear like water. Choose water and other caffeine-free clear liquids until you feel better. If you have kidney, heart, or liver disease and have to limit fluids, talk with your doctor before you increase the amount of fluids you drink. · Rest in bed until you feel better. · When you are able to eat, try clear soups, mild foods, and liquids until all symptoms are gone for 12 to 48 hours. Other good choices include dry toast, crackers, cooked cereal, and gelatin dessert, such as Jell-O. When should you call for help? Call 911 anytime you think you may need emergency care. For example, call if: 
· You passed out (lost consciousness). Call your doctor now or seek immediate medical care if: 
· You have symptoms of dehydration, such as: ¨ Dry eyes and a dry mouth. ¨ Passing only a little dark urine.  
¨ Feeling thirstier than usual. 
 · You have new or worsening belly pain. · You have a new or higher fever. · You vomit blood or what looks like coffee grounds. Watch closely for changes in your health, and be sure to contact your doctor if: 
· You have ongoing nausea and vomiting. · Your vomiting is getting worse. · Your vomiting lasts longer than 2 days. · You are not getting better as expected. Where can you learn more? Go to http://roosevelt-angelica.info/. Enter 25 [id number] in the search box to learn more about \"Nausea and Vomiting: Care Instructions. \" Current as of: March 20, 2017 Content Version: 11.3 © 0779-8816 [employer]. Care instructions adapted under license by [employer] (which disclaims liability or warranty for this information). If you have questions about a medical condition or this instruction, always ask your healthcare professional. [place] [address] any warranty or liability for your use of this information. Opioid Withdrawal: Care Instructions Your Care Instructions Opioids are strong pain medicines. Examples include hydrocodone, oxycodone, fentanyl, and morphine. Heroin is an example of an illegal opioid. Your body gets used to this type of drug if you take it all the time. This is called being dependent on the drug. And when you stop taking it, you go through withdrawal. 
Withdrawal symptoms can include nausea, sweating, chills, diarrhea, stomach cramps, and muscle aches. Withdrawal can last up to several weeks, depending on which drug you took. You may feel very ill, but you are probably not in medical danger. Withdrawal isn't easy, but there are things you can do to help you cope with the symptoms. You will feel a little bit better each day as your body adjusts and heals itself. Follow-up care is a key part of your treatment and safety.  Be sure to make and go to all appointments, and call your doctor if you are having problems. It's also a good idea to know your test results and keep a list of the medicines you take. How can you care for yourself at home? · Your doctor may give you medicine to help you feel better. Read and follow all instructions on the label. · To help get through withdrawal, you can also: ¨ Get plenty of rest. 
¨ Drink plenty of fluids. ¨ Stay active, but don't tire yourself. ¨ Eat a healthy diet. · Do not drink alcohol or take illegal drugs. · Talk to your doctor about drug treatment programs to help you stay drug-free. · Talk to your doctor or pharmacist about having a naloxone rescue kit on hand. When should you call for help? Call 911 anytime you think you may need emergency care. For example, call if: 
· You feel you cannot stop from hurting yourself or someone else. Call your doctor now or seek immediate medical care if: 
· You have new or worse withdrawal symptoms that you can't manage at home, such as: ¨ Stomach cramps. ¨ Vomiting. ¨ Diarrhea. ¨ Muscle aches. ¨ Sweating. Watch closely for changes in your health, and be sure to contact your doctor if: 
· You do not get better as expected. Where can you learn more? Go to http://roosevelt-angelica.info/. Enter E242 in the search box to learn more about \"Opioid Withdrawal: Care Instructions. \" Current as of: February 21, 2017 Content Version: 11.3 © 8232-9635 [employer]. Care instructions adapted under license by [employer] (which disclaims liability or warranty for this information). If you have questions about a medical condition or this instruction, always ask your healthcare professional. [address] any warranty or liability for your use of this information. Discharge Orders None Introducing [facility]!    
 [employer] introduces [employer] patient portal. Now you can access parts of your medical record, email your doctor's office, and request medication refills online. 1. In your internet browser, go to [email or web address] EHRt 2. Click on the First Time User? Click Here link in the Sign In box. You will see the New Member Sign Up page. 3. Enter your Self-A-r-T Access Code exactly as it appears below. You will not need to use this code after youve completed the sign-up process. If you do not sign up before the expiration date, you must request a new code. · Self-A-r-T Access Code: [email or web address] Expires: 10/18/2017 10:30 AM 
 
4. Enter the last four digits of your Social Security Number (xxxx) and Date of Birth (mm/dd/yyyy) as indicated and click Submit. You will be taken to the next sign-up page. 5. Create a Self-A-r-T ID. This will be your Self-A-r-T login ID and cannot be changed, so think of one that is secure and easy to remember. 6. Create a Self-A-r-T password. You can change your password at any time. 7. Enter your Password Reset Question and Answer. This can be used at a later time if you forget your password. 8. Enter your e-mail address. You will receive e-mail notification when new information is available in [address] 9. Click Sign Up. You can now view and download portions of your medical record. 10. Click the Download Summary menu link to download a portable copy of your medical information. If you have questions, please visit the Frequently Asked Questions section of the Self-A-r-T website. Remember, Self-A-r-T is NOT to be used for urgent needs. For medical emergencies, dial 911. Now available from your iPhone and Android! General Information Please provide this summary of care documentation to your next provider. Patient Signature:  ____________________________________________________________ Date:  ____________________________________________________________  
  
Kim Oka Provider Signature:  ____________________________________________________________ Date:  ____________________________________________________________

## 2017-07-19 NOTE — IP AVS SNAPSHOT
[address] Patient: Francia Poole MRN: [id number] [id number] Current Discharge Medication List  
  
START taking these medications Dose & Instructions Dispensing Information Comments Morning Noon Evening Bedtime  
 metoclopramide HCl 10 mg tablet Commonly known as:  REGLAN Your last dose was: Your next dose is:    
   
   
 Dose:  10 mg Take 1 Tab by mouth Before breakfast, lunch, dinner and at bedtime for 10 days. Quantity:  40 Tab Refills:  0  
     
   
   
   
  
 oxyCODONE-acetaminophen 5-325 mg per tablet Commonly known as:  PERCOCET Your last dose was: Your next dose is:    
   
   
 Dose:  1 Tab Take 1 Tab by mouth every four (4) hours as needed. Max Daily Amount: 6 Tabs. Quantity:  10 Tab Refills:  0  
     
   
   
   
  
 * promethazine 25 mg suppository Commonly known as:  PHENERGAN Your last dose was: Your next dose is:    
   
   
 Dose:  25 mg Insert 1 Suppository into rectum every six (6) hours as needed for Nausea for up to 7 days. Quantity:  12 Suppository Refills:  0  
     
   
   
   
  
 * promethazine 25 mg suppository Commonly known as:  PHENERGAN Your last dose was: Your next dose is:    
   
   
 Dose:  25 mg Insert 1 Suppository into rectum every six (6) hours as needed for Nausea for up to 7 days. Quantity:  15 Suppository Refills:  0  
     
   
   
   
  
 * Notice: This list has 2 medication(s) that are the same as other medications prescribed for you. Read the directions carefully, and ask your doctor or other care provider to review them with you. CONTINUE these medications which have CHANGED Dose & Instructions Dispensing Information Comments Morning Noon Evening Bedtime  
 oxyCODONE ER 20 mg ER tablet Commonly known as:  OxyCONTIN  
 What changed:  Another medication with the same name was removed. Continue taking this medication, and follow the directions you see here. Your last dose was: Your next dose is:    
   
   
  Refills:  0  
     
   
   
   
  
 * pantoprazole 40 mg tablet Commonly known as:  PROTONIX What changed:  Another medication with the same name was added. Make sure you understand how and when to take each. Your last dose was: Your next dose is:    
   
   
 Dose:  40 mg Take 1 Tab by mouth two (2) times a day. Quantity:  60 Tab Refills:  0  
     
   
   
   
  
 * pantoprazole 40 mg tablet Commonly known as:  PROTONIX What changed: You were already taking a medication with the same name, and this prescription was added. Make sure you understand how and when to take each. Your last dose was: Your next dose is:    
   
   
 Dose:  40 mg Take 1 Tab by mouth Before breakfast and dinner. Indications: Upper GI Bleed Quantity:  60 Tab Refills:  0  
     
   
   
   
  
 * Notice: This list has 2 medication(s) that are the same as other medications prescribed for you. Read the directions carefully, and ask your doctor or other care provider to review them with you. CONTINUE these medications which have NOT CHANGED Dose & Instructions Dispensing Information Comments Morning Noon Evening Bedtime  
 albuterol 90 mcg/actuation inhaler Commonly known as:  PROVENTIL HFA, VENTOLIN HFA, PROAIR HFA Your last dose was: Your next dose is:    
   
   
 Dose:  2 Puff Take 2 Puffs by inhalation every four (4) hours as needed for Wheezing. Quantity:  1 Inhaler Refills:  0  
     
   
   
   
  
 CARAFATE 100 mg/mL suspension Generic drug:  sucralfate Your last dose was: Your next dose is: Take  by mouth four (4) times daily. Take 10 mL by Mouth 4 Times a Day Before Meals & at Bedtime. Refills:  0 fluticasone 50 mcg/actuation nasal spray Commonly known as:  Aurora Sale Your last dose was: Your next dose is:    
   
   
 Dose:  2 Spray 2 Sprays by Both Nostrils route daily. Quantity:  1 Bottle Refills:  5  
     
   
   
   
  
 metoprolol succinate 100 mg tablet Commonly known as:  TOPROL-XL Your last dose was: Your next dose is:    
   
   
 Dose:  100 mg Take 1 Tab by mouth daily. Quantity:  30 Tab Refills:  5  
     
   
   
   
  
 raNITIdine 300 mg tablet Commonly known as:  ZANTAC Your last dose was: Your next dose is:    
   
   
 Dose:  300 mg Take 1 Tab by mouth daily. Quantity:  30 Tab Refills:  5 ZyrTEC 10 mg tablet Generic drug:  cetirizine Your last dose was: Your next dose is: Take  by mouth daily. Refills:  0 ASK your doctor about these medications Dose & Instructions Dispensing Information Comments Morning Noon Evening Bedtime  
 cyclobenzaprine 10 mg tablet Commonly known as:  FLEXERIL Your last dose was: Your next dose is:    
   
   
  Refills:  0 LYRICA 75 mg capsule Generic drug:  pregabalin Your last dose was: Your next dose is:    
   
   
  Refills:  0 Where to Get Your Medications Information on where to get these meds will be given to you by the nurse or doctor. ! Ask your nurse or doctor about these medications  
  metoclopramide HCl 10 mg tablet  
 oxyCODONE-acetaminophen 5-325 mg per tablet  
 pantoprazole 40 mg tablet  
 promethazine 25 mg suppository  
 promethazine 25 mg suppository

## 2017-07-19 NOTE — ED TRIAGE NOTES
Per medic: Patient was on his computer and suddenly started complaining of severe abd pain. Family states patient passed out. . Patient was noted to  Have coffee grounds emesis and 3 syncopal episodes while with EMS.

## 2017-07-19 NOTE — ED PROVIDER NOTES
HPI Comments:   7:34 PM  Tiffany  is a 33 y.o. male with hx of IBS, presents to ED with father at beside C/O blood in vomit, diarrhea, and abdominal pain, started this morning, no blood present in stool. Pt was previously seen at a hospital in [facility] 1 month ago with same symptoms. Pt states he was dx with ulcers in his colon years ago. Pt admits to stopping Oxycodone one week ago, prior usage for back pain, 15 mg x 5/day. Father states he was \"trying to quit on his own\". Vomiting at bedside. Pt states he has been taking narcotics since discharged from [facility] and still trying to wean himself off. He has not followed up with a GI physician at [facility] since arriving from [facility]. Other PMHx include herpes, heartburn, acid indigestion, joint pain, HTN, asthma, muscle pain and weakness, and ADHD. Other PSHx include right hand surgery. Pt denies fever or any other sx. The history is provided by the patient. No  was used. Past Medical History:   Diagnosis Date    Acid indigestion     ADHD (attention deficit hyperactivity disorder)     Asthma     Back pain     Heartburn     Herpes     HTN (hypertension)     IBS (irritable bowel syndrome)     Joint pain     Muscle pain     Muscle weakness     Trouble in sleeping        Past Surgical History:   Procedure Laterality Date    HX BACK SURGERY  2013    HX ORTHOPAEDIC      RT HAND         Family History:   Problem Relation Age of Onset    Cancer Other      GRANDMOTHER, AND AUNT    Diabetes Other      GREAT GRANDFATHER    Heart Disease Other      AUNT    Hypertension Other      GRANDMOTHER, GRANDFATHER, MOTHER    Stroke Other     Immunodeficiency Other      ALL FAMILY MEMBERS       Social History     Social History    Marital status: SINGLE     Spouse name: N/A    Number of children: N/A    Years of education: N/A     Occupational History    Not on file.      Social History Main Topics  Smoking status: Current Every Day Smoker     Packs/day: 0.50     Years: 13.00     Types: Cigarettes    Smokeless tobacco: Never Used    Alcohol use 1.0 oz/week     2 Standard drinks or equivalent per week      Comment: rare    Drug use: Yes     Special: Marijuana, Prescription, OTC    Sexual activity: No     Other Topics Concern    Not on file     Social History Narrative         ALLERGIES: Amoxicillin; Demerol [meperidine]; Hydrocodone-acetaminophen; Ketorolac; Pcn [penicillins]; Sulfa (sulfonamide antibiotics); and Tramadol    Review of Systems   Constitutional: Negative for chills and fever. HENT: Negative for sore throat and trouble swallowing. Eyes: Negative for visual disturbance. Respiratory: Negative for cough, chest tightness and shortness of breath. Cardiovascular: Negative for chest pain. Gastrointestinal: Positive for abdominal pain, nausea and vomiting (at bedside, hematemesis). Negative for abdominal distention, anal bleeding, blood in stool, constipation and diarrhea. Genitourinary: Negative for difficulty urinating and dysuria. Musculoskeletal: Negative for gait problem and neck pain. History of chronic back pain, no change   Skin: Negative for pallor and rash. Neurological: Negative for dizziness and light-headedness. Hematological: Negative for adenopathy. Does not bruise/bleed easily. All other systems reviewed and are negative. Vitals:    07/19/17 1849 07/19/17 2000 07/19/17 2156 07/19/17 2200   BP: (!) 151/112 (!) 165/109 (!) 173/114 (!) 163/114   Pulse: 92 72 74 66   Resp: 20 18 14 12   Temp: 95.8 °F (35.4 °C)  97.9 °F (36.6 °C)    SpO2: 99% 100% 100% 100%            Physical Exam   Constitutional: He is oriented to person, place, and time. He appears well-developed and well-nourished. No distress. Vomiting at bedside. Is not coffee ground. Is not red. Appears brownish   HENT:   Head: Normocephalic and atraumatic.    Mouth/Throat: Oropharynx is clear and moist.   Eyes: Conjunctivae and EOM are normal. Pupils are equal, round, and reactive to light. No scleral icterus. Neck: Normal range of motion. Neck supple. No tracheal deviation present. Cardiovascular: Intact distal pulses. Capillary refill < 3 seconds   Pulmonary/Chest: Effort normal and breath sounds normal. No stridor. No respiratory distress. He has no wheezes. Abdominal: Soft. Bowel sounds are normal. He exhibits no distension and no mass. There is tenderness (generalized, diffused). There is no rebound and no guarding. Musculoskeletal: Normal range of motion. He exhibits no edema. Lymphadenopathy:     He has no cervical adenopathy. Neurological: He is alert and oriented to person, place, and time. No cranial nerve deficit. Skin: Skin is warm and dry. No rash noted. He is not diaphoretic. Psychiatric: Thought content normal.   Nursing note and vitals reviewed. MDM  Number of Diagnoses or Management Options  Abdominal pain, generalized:   Cyclical vomiting with nausea, intractability of vomiting not specified:   Elevated blood pressure reading:   Opiate withdrawal [facility]):   Diagnosis management comments: ddx SBO, colitis, viral illness, food poison, metabolic, opioid w/d    Labs, IVF bolus, analgesia, zofran, protonix, AAS, catapres patch    Although CT abd showed mild colitis, his Colonoscopy did not show this, but showed colonoscopy findings were localized, gastric erythema, small hiatal hernia, normal colon    Labs here show nothing alarming. I believe this is opiate w/d related vs mild colitis vs viral illness vs food poison. He states zofran PO not helping. Will give Rx  Phenergan    At dc, pt is vomiting more, BP elevated. Will give phenergan IVPB. Pt may need admission if vomiting intractable.      Will sign out to Dr. Karan Spivey        Amount and/or Complexity of Data Reviewed  Clinical lab tests: ordered and reviewed  Tests in the radiology section of CPT®: ordered and reviewed (CT ABD PELV W/CONT, abd x ray, CXR)  Tests in the medicine section of CPT®: ordered and reviewed  Discussion of test results with the performing providers: yes  Review and summarize past medical records: yes  Discuss the patient with other providers: yes  Independent visualization of images, tracings, or specimens: yes (CXR, abd x ray)      ED Course       Procedures      Vitals:  Patient Vitals for the past 12 hrs:   Temp Pulse Resp BP SpO2   07/19/17 2200 - 66 12 (!) 163/114 100 %   07/19/17 2156 97.9 °F (36.6 °C) 74 14 (!) 173/114 100 %   07/19/17 2000 - 72 18 (!) 165/109 100 %   07/19/17 1849 95.8 °F (35.4 °C) 92 20 (!) 151/112 99 %       Medications Ordered:  Medications   cloNIDine (CATAPRES) 0.2 mg/24 hr patch 1 Patch (0 Patches TransDERmal Held 7/19/17 2041)   ondansetron (ZOFRAN) injection 4 mg (not administered)   promethazine (PHENERGAN) 25 mg in 0.9% sodium chloride 50 mL IVPB (not administered)   sodium chloride 0.9 % bolus infusion 1,000 mL (0 mL IntraVENous IV Completed 7/19/17 2027)   ondansetron (ZOFRAN) injection 4 mg (4 mg IntraVENous Given 7/19/17 1927)   sodium chloride 0.9 % bolus infusion 1,000 mL (1,000 mL IntraVENous New Bag 7/19/17 2037)   ondansetron (ZOFRAN) injection 4 mg (4 mg IntraVENous Given 7/19/17 2038)   pantoprazole (PROTONIX) injection 40 mg (40 mg IntraVENous Given 7/19/17 2038)   morphine injection 4 mg (4 mg IntraVENous Given 7/19/17 2040)         Lab Findings:  Recent Results (from the past 12 hour(s))   CBC WITH AUTOMATED DIFF    Collection Time: 07/19/17  7:00 PM   Result Value Ref Range    WBC 12.0 4.6 - 13.2 K/uL    RBC 4.93 4.70 - 5.50 M/uL    HGB 14.7 13.0 - 16.0 g/dL    HCT 42.7 36.0 - 48.0 %    MCV 86.6 74.0 - 97.0 FL    MCH 29.8 24.0 - 34.0 PG    MCHC 34.4 31.0 - 37.0 g/dL    RDW 12.9 11.6 - 14.5 %    PLATELET 171 998 - 754 K/uL    MPV 10.6 9.2 - 11.8 FL    NEUTROPHILS 86 (H) 40 - 73 %    LYMPHOCYTES 11 (L) 21 - 52 %    MONOCYTES 3 3 - 10 %    EOSINOPHILS 0 0 - 5 %    BASOPHILS 0 0 - 2 %    ABS. NEUTROPHILS 10.2 (H) 1.8 - 8.0 K/UL    ABS. LYMPHOCYTES 1.3 0.9 - 3.6 K/UL    ABS. MONOCYTES 0.4 0.05 - 1.2 K/UL    ABS. EOSINOPHILS 0.0 0.0 - 0.4 K/UL    ABS. BASOPHILS 0.0 0.0 - 0.1 K/UL    DF AUTOMATED     METABOLIC PANEL, BASIC    Collection Time: 07/19/17  7:00 PM   Result Value Ref Range    Sodium 141 136 - 145 mmol/L    Potassium 3.4 (L) 3.5 - 5.5 mmol/L    Chloride 108 100 - 108 mmol/L    CO2 25 21 - 32 mmol/L    Anion gap 8 3.0 - 18 mmol/L    Glucose 134 (H) 74 - 99 mg/dL    BUN 8 7.0 - 18 MG/DL    Creatinine 0.83 0.6 - 1.3 MG/DL    BUN/Creatinine ratio 10 (L) 12 - 20      GFR est AA >60 >60 ml/min/1.73m2    GFR est non-AA >60 >60 ml/min/1.73m2    Calcium 8.9 8.5 - 10.1 MG/DL   CARDIAC PANEL,(CK, CKMB & TROPONIN)    Collection Time: 07/19/17  7:00 PM   Result Value Ref Range    CK 28 (L) 39 - 308 U/L    CK - MB <1.0 <3.6 ng/ml    CK-MB Index  0.0 - 4.0 %     CALCULATION NOT PERFORMED WHEN RESULT IS BELOW LINEAR LIMIT    Troponin-I, Qt. <0.02 0.0 - 0.045 NG/ML   PROTHROMBIN TIME + INR    Collection Time: 07/19/17  7:00 PM   Result Value Ref Range    Prothrombin time 14.6 11.5 - 15.2 sec    INR 1.2 0.8 - 1.2     PTT    Collection Time: 07/19/17  7:00 PM   Result Value Ref Range    aPTT 25.7 23.0 - 36.4 SEC   HEPATIC FUNCTION PANEL    Collection Time: 07/19/17  7:00 PM   Result Value Ref Range    Protein, total 7.6 6.4 - 8.2 g/dL    Albumin 4.4 3.4 - 5.0 g/dL    Globulin 3.2 2.0 - 4.0 g/dL    A-G Ratio 1.4 0.8 - 1.7      Bilirubin, total 0.6 0.2 - 1.0 MG/DL    Bilirubin, direct 0.2 0.0 - 0.2 MG/DL    Alk.  phosphatase 78 45 - 117 U/L    AST (SGOT) 10 (L) 15 - 37 U/L    ALT (SGPT) 18 16 - 61 U/L   LIPASE    Collection Time: 07/19/17  7:00 PM   Result Value Ref Range    Lipase 120 73 - 393 U/L           X-ray, CT or radiology findings or impressions:  Xr Abd Acute W 1 V Chest    Result Date: 7/19/2017  EXAMINATION: Abdomen flat/upright, single view chest INDICATION: Hematemesis, abdominal pain COMPARISON: 4/18/2017 FINDINGS: Frontal view the chest obtained. No consolidation. Mediastinal silhouette and prominent vascular unremarkable. No evidence of pneumothorax. No acute osseous findings. Frontal supine and upright views of the abdomen obtained. Paucity of abdominal bowel gas. Evidence of gastric air bubble and probable air in the rectum. No evidence of free air. No suspicious calcifications identified. No acute osseous findings. IMPRESSION: 1. No definite acute findings. Paucity of abdominal bowel gas may be seen with fluid-filled bowel loops. Obstruction unlikely given lack of gastric distention and presence of air within the rectum. Pt had CT abd 7/9/17 in [facility]: 1. Mild diffuse colonic thickening compatible with a colitis. This may be inflammatory or infectious and less likely ischemic. No obstruction, abscess or perforation. Tiny, likely reactive, mesenteric lymph nodes. 2. Small hiatal hernia. Progress notes, consult notes, or additional procedure notes:  PROGRESS NOTE:   7:51 PM  Reviewed old medical records: [address] [place] [address]  pt was admitted 7/9/2017 and discharged 7/13/2017  seen by GI. Performed EGD and colonoscopy. No colitis was shown in colonoscopy. negative C. diff  EGD and colonoscopy findings were localized, gastric erythema, small hiatal hernia, normal colon,   had CT abd that showed mild diffused colic thickening, compatible with colitis, may be inflammatory or infectious, less likely ischemic, no obstruction, abscess, or perforation. drug screen was positive for Amphetamine, Cannabis, Opioids and Oxycodone  discharge dx were: resolved nausea and vomiting, improving abd pain, which could be due to underlying gastritis, narcotic use vs marijuana use  Oxycodone dependent, possible complicated with withdrawal. Had coffee ground emesis, resolved.  H&H were stable    10:23PM  I discussed case, pertinent results and recent hospitalizaiton with Dr. Nubia Shepard, gastroenterologist. He agrees pt can be discharged home if stable, with outpatient follow up with his GI doctor. Reassessed pt and back to vomiting again. Dose phenergan given    10:51 PM : Pt care transferred to Dr. Hemalatha Chambers ,ED provider. History of patient complaint(s), available diagnostic reports and current treatment plan has been discussed thoroughly. Bedside rounding on patient occured : yes . Intended disposition of patient : TBD  Pending diagnostics reports and/or labs (please list): give more antiemetic, and IVF, reassess, dispo    Diagnosis:   1. Opiate withdrawal (HCC)    2. Abdominal pain, generalized    3. Cyclical vomiting with nausea, intractability of vomiting not specified    4. Elevated blood pressure reading        Disposition: pending    Follow-up Information     Follow up With Details [address] MD Mercedez Call in 1 day  [address] Cruz [address]  [facility] [address]  [address] [facility]  Inga Luis MD Schedule an appointment as soon as possible for a visit in 1 day  [address] [place] [address]  [phone]        As needed, If symptoms worsen           Patient's Medications   Start Taking    PROMETHAZINE (PHENERGAN) 25 MG SUPPOSITORY    Insert 1 Suppository into rectum every six (6) hours as needed for Nausea for up to 7 days. Continue Taking    ALBUTEROL (PROVENTIL HFA, VENTOLIN HFA, PROAIR HFA) 90 MCG/ACTUATION INHALER    Take 2 Puffs by inhalation every four (4) hours as needed for Wheezing. CETIRIZINE (ZYRTEC) 10 MG TABLET    Take  by mouth daily. CYCLOBENZAPRINE (FLEXERIL) 10 MG TABLET        FLUTICASONE (FLONASE) 50 MCG/ACTUATION NASAL SPRAY    2 Sprays by Both Nostrils route daily. LYRICA 75 MG CAPSULE        METOPROLOL SUCCINATE (TOPROL-XL) 100 MG TABLET    Take 1 Tab by mouth daily.     OXYCODONE ER (OXYCONTIN) 20 MG ER TABLET        OXYCODONE IR (OXY-IR) 15 MG IMMEDIATE RELEASE TABLET        PANTOPRAZOLE (PROTONIX) 40 MG TABLET    Take 1 Tab by mouth two (2) times a day. RANITIDINE (ZANTAC) 300 MG TABLET    Take 1 Tab by mouth daily. SUCRALFATE (CARAFATE) 100 MG/ML SUSPENSION    Take  by mouth four (4) times daily. Take 10 mL by Mouth 4 Times a Day Before Meals & at Bedtime. These Medications have changed    No medications on file   Stop Taking    No medications on file       Scribe Attestation      Destiny Holland and Gene Mullen, acting as scribes for and in the presence of Luis Cancino DO      July 19, 2017 at 8:19 PM       Provider Attestation:      I personally performed the services described in the documentation, reviewed the documentation, as recorded by the scribe in my presence, and it accurately and completely records my words and actions.      Luis Cancino DO        Signed by: Destiny Holland and Kacy Dorado, July 19, 2017 at 8:27 PM

## 2017-07-20 ENCOUNTER — APPOINTMENT (OUTPATIENT)
Dept: ULTRASOUND IMAGING | Age: 32
DRG: 249 | End: 2017-07-20
Attending: INTERNAL MEDICINE
Payer: MEDICAID

## 2017-07-20 PROBLEM — K52.9 COLITIS: Status: ACTIVE | Noted: 2017-07-20

## 2017-07-20 LAB
AMPHET UR QL SCN: NEGATIVE
BACTERIA SPEC CULT: ABNORMAL
BACTERIA SPEC CULT: ABNORMAL
BARBITURATES UR QL SCN: NEGATIVE
BASOPHILS # BLD AUTO: 0.1 K/UL (ref 0–0.1)
BASOPHILS # BLD: 0 % (ref 0–2)
BENZODIAZ UR QL: NEGATIVE
CANNABINOIDS UR QL SCN: POSITIVE
COCAINE UR QL SCN: NEGATIVE
DIFFERENTIAL METHOD BLD: ABNORMAL
EOSINOPHIL # BLD: 0.1 K/UL (ref 0–0.4)
EOSINOPHIL NFR BLD: 1 % (ref 0–5)
ERYTHROCYTE [DISTWIDTH] IN BLOOD BY AUTOMATED COUNT: 12.9 % (ref 11.6–14.5)
FOLATE SERPL-MCNC: 11.7 NG/ML (ref 3.1–17.5)
GASTROCULT GAST QL: POSITIVE
HCT VFR BLD AUTO: 40.1 % (ref 36–48)
HDSCOM,HDSCOM: ABNORMAL
HGB BLD-MCNC: 13.7 G/DL (ref 13–16)
HIV 1+2 AB+HIV1 P24 AG SERPL QL IA: NONREACTIVE
HIV12 RESULT COMMENT, HHIVC: NORMAL
LYMPHOCYTES # BLD AUTO: 19 % (ref 21–52)
LYMPHOCYTES # BLD: 2.5 K/UL (ref 0.9–3.6)
MCH RBC QN AUTO: 29.8 PG (ref 24–34)
MCHC RBC AUTO-ENTMCNC: 34.2 G/DL (ref 31–37)
MCV RBC AUTO: 87.2 FL (ref 74–97)
METHADONE UR QL: NEGATIVE
MONOCYTES # BLD: 1.4 K/UL (ref 0.05–1.2)
MONOCYTES NFR BLD AUTO: 11 % (ref 3–10)
NEUTS SEG # BLD: 9 K/UL (ref 1.8–8)
NEUTS SEG NFR BLD AUTO: 69 % (ref 40–73)
OPIATES UR QL: POSITIVE
PCP UR QL: NEGATIVE
PH GAST: 3 [PH] (ref 1.5–3.5)
PLATELET # BLD AUTO: 285 K/UL (ref 135–420)
PMV BLD AUTO: 10.5 FL (ref 9.2–11.8)
RBC # BLD AUTO: 4.6 M/UL (ref 4.7–5.5)
SERVICE CMNT-IMP: ABNORMAL
VIT B12 SERPL-MCNC: 282 PG/ML (ref 211–911)
WBC # BLD AUTO: 13 K/UL (ref 4.6–13.2)

## 2017-07-20 PROCEDURE — 74011000250 HC RX REV CODE- 250: Performed by: HOSPITALIST

## 2017-07-20 PROCEDURE — 74011000258 HC RX REV CODE- 258: Performed by: HOSPITALIST

## 2017-07-20 PROCEDURE — 80307 DRUG TEST PRSMV CHEM ANLYZR: CPT | Performed by: INTERNAL MEDICINE

## 2017-07-20 PROCEDURE — 74011636320 HC RX REV CODE- 636/320: Performed by: EMERGENCY MEDICINE

## 2017-07-20 PROCEDURE — 82607 VITAMIN B-12: CPT | Performed by: INTERNAL MEDICINE

## 2017-07-20 PROCEDURE — 86618 LYME DISEASE ANTIBODY: CPT | Performed by: INTERNAL MEDICINE

## 2017-07-20 PROCEDURE — 74011250637 HC RX REV CODE- 250/637: Performed by: HOSPITALIST

## 2017-07-20 PROCEDURE — 86038 ANTINUCLEAR ANTIBODIES: CPT | Performed by: INTERNAL MEDICINE

## 2017-07-20 PROCEDURE — 36415 COLL VENOUS BLD VENIPUNCTURE: CPT | Performed by: INTERNAL MEDICINE

## 2017-07-20 PROCEDURE — 87641 MR-STAPH DNA AMP PROBE: CPT | Performed by: INTERNAL MEDICINE

## 2017-07-20 PROCEDURE — 87389 HIV-1 AG W/HIV-1&-2 AB AG IA: CPT | Performed by: INTERNAL MEDICINE

## 2017-07-20 PROCEDURE — 74011250637 HC RX REV CODE- 250/637: Performed by: INTERNAL MEDICINE

## 2017-07-20 PROCEDURE — 85025 COMPLETE CBC W/AUTO DIFF WBC: CPT | Performed by: HOSPITALIST

## 2017-07-20 PROCEDURE — 76705 ECHO EXAM OF ABDOMEN: CPT

## 2017-07-20 PROCEDURE — 74011250636 HC RX REV CODE- 250/636: Performed by: INTERNAL MEDICINE

## 2017-07-20 PROCEDURE — 82271 OCCULT BLOOD OTHER SOURCES: CPT | Performed by: INTERNAL MEDICINE

## 2017-07-20 PROCEDURE — 74011250636 HC RX REV CODE- 250/636: Performed by: HOSPITALIST

## 2017-07-20 PROCEDURE — 65270000029 HC RM PRIVATE

## 2017-07-20 RX ORDER — CLONIDINE 0.3 MG/24H
1 PATCH, EXTENDED RELEASE TRANSDERMAL
Status: DISCONTINUED | OUTPATIENT
Start: 2017-07-20 | End: 2017-07-22 | Stop reason: HOSPADM

## 2017-07-20 RX ORDER — SODIUM CHLORIDE 9 MG/ML
100 INJECTION, SOLUTION INTRAVENOUS CONTINUOUS
Status: DISCONTINUED | OUTPATIENT
Start: 2017-07-20 | End: 2017-07-20

## 2017-07-20 RX ORDER — ACETAMINOPHEN 325 MG/1
650 TABLET ORAL
Status: DISCONTINUED | OUTPATIENT
Start: 2017-07-20 | End: 2017-07-22 | Stop reason: HOSPADM

## 2017-07-20 RX ORDER — ONDANSETRON 2 MG/ML
4 INJECTION INTRAMUSCULAR; INTRAVENOUS
Status: DISCONTINUED | OUTPATIENT
Start: 2017-07-20 | End: 2017-07-22 | Stop reason: HOSPADM

## 2017-07-20 RX ORDER — HYDRALAZINE HYDROCHLORIDE 20 MG/ML
10 INJECTION INTRAMUSCULAR; INTRAVENOUS
Status: DISCONTINUED | OUTPATIENT
Start: 2017-07-20 | End: 2017-07-22 | Stop reason: HOSPADM

## 2017-07-20 RX ORDER — ONDANSETRON 2 MG/ML
INJECTION INTRAMUSCULAR; INTRAVENOUS
Status: DISPENSED
Start: 2017-07-20 | End: 2017-07-20

## 2017-07-20 RX ORDER — OXYCODONE AND ACETAMINOPHEN 5; 325 MG/1; MG/1
1 TABLET ORAL
Status: DISCONTINUED | OUTPATIENT
Start: 2017-07-20 | End: 2017-07-22 | Stop reason: HOSPADM

## 2017-07-20 RX ORDER — HEPARIN SODIUM 5000 [USP'U]/ML
5000 INJECTION, SOLUTION INTRAVENOUS; SUBCUTANEOUS EVERY 8 HOURS
Status: DISCONTINUED | OUTPATIENT
Start: 2017-07-20 | End: 2017-07-20

## 2017-07-20 RX ORDER — FAMOTIDINE 10 MG/ML
20 INJECTION INTRAVENOUS EVERY 12 HOURS
Status: DISCONTINUED | OUTPATIENT
Start: 2017-07-20 | End: 2017-07-21

## 2017-07-20 RX ORDER — POTASSIUM CHLORIDE 20 MEQ/1
40 TABLET, EXTENDED RELEASE ORAL EVERY 4 HOURS
Status: DISCONTINUED | OUTPATIENT
Start: 2017-07-20 | End: 2017-07-20

## 2017-07-20 RX ADMIN — FAMOTIDINE 20 MG: 10 INJECTION, SOLUTION INTRAVENOUS at 22:11

## 2017-07-20 RX ADMIN — DEXTROSE MONOHYDRATE AND SODIUM CHLORIDE: 5; .9 INJECTION, SOLUTION INTRAVENOUS at 11:48

## 2017-07-20 RX ADMIN — SODIUM CHLORIDE 100 ML/HR: 900 INJECTION, SOLUTION INTRAVENOUS at 09:02

## 2017-07-20 RX ADMIN — IOPAMIDOL 100 ML: 612 INJECTION, SOLUTION INTRAVENOUS at 00:23

## 2017-07-20 RX ADMIN — ONDANSETRON 4 MG: 2 INJECTION INTRAMUSCULAR; INTRAVENOUS at 18:02

## 2017-07-20 RX ADMIN — ACETAMINOPHEN 650 MG: 325 TABLET ORAL at 22:11

## 2017-07-20 RX ADMIN — ONDANSETRON 4 MG: 2 INJECTION INTRAMUSCULAR; INTRAVENOUS at 10:20

## 2017-07-20 RX ADMIN — OXYCODONE AND ACETAMINOPHEN 1 TABLET: 5; 325 TABLET ORAL at 23:30

## 2017-07-20 RX ADMIN — DEXTROSE MONOHYDRATE AND SODIUM CHLORIDE: 5; .9 INJECTION, SOLUTION INTRAVENOUS at 23:17

## 2017-07-20 RX ADMIN — OXYCODONE AND ACETAMINOPHEN 1 TABLET: 5; 325 TABLET ORAL at 11:47

## 2017-07-20 RX ADMIN — FAMOTIDINE 20 MG: 10 INJECTION, SOLUTION INTRAVENOUS at 10:19

## 2017-07-20 NOTE — H&P
History & Physical    Patient: Mikaela Jade MRN: [id number]  CSN: [id number]    YOB: 1985  Age: 33 y.o. Sex: male      DOA: 7/19/2017    Chief Complaint:   Chief Complaint   Patient presents with    Abdominal Pain    Vomiting          HPI:     Mikaela Jade is a 33 y.o.   male who presents to ER with  Chronic vomitting nausea and intermittent diarrhea progressive over last 3 weeks worse after weaning himself off of oxycodone/ and oxycontin  Denies any sick contacts using marijuana for nausea and abdmoninal discomfort   Note most of history being obtained by family member as patient is sleepy and slow to talk   Suffers from chronic pain and depression started after back surgery 5 years ago   Recent Trip to [facility] state required frequent hospitalization   No EGD performed but has UGI denies fever chills or sweats     Past Medical History:   Diagnosis Date    Acid indigestion     ADHD (attention deficit hyperactivity disorder)     Asthma     Back pain     Heartburn     Herpes     HTN (hypertension)     IBS (irritable bowel syndrome)     Joint pain     Muscle pain     Muscle weakness     Trouble in sleeping        Past Surgical History:   Procedure Laterality Date    HX BACK SURGERY  2013    HX ORTHOPAEDIC      RT HAND       Family History   Problem Relation Age of Onset    Cancer Other      GRANDMOTHER, AND AUNT    Diabetes Other      GREAT GRANDFATHER    Heart Disease Other      AUNT    Hypertension Other      GRANDMOTHER, GRANDFATHER, MOTHER    Stroke Other     Immunodeficiency Other      ALL FAMILY MEMBERS       Social History     Social History    Marital status: SINGLE     Spouse name: N/A    Number of children: N/A    Years of education: N/A     Social History Main Topics    Smoking status: Current Every Day Smoker     Packs/day: 0.50     Years: 13.00     Types: Cigarettes    Smokeless tobacco: Never Used    Alcohol use 1.0 oz/week     2 Standard drinks or equivalent per week      Comment: rare    Drug use: Yes     Special: Marijuana, Prescription, OTC    Sexual activity: No     Other Topics Concern    None     Social History Narrative       Prior to Admission medications    Medication Sig Start Date End Date Taking? Authorizing Provider   promethazine (PHENERGAN) 25 mg suppository Insert 1 Suppository into rectum every six (6) hours as needed for Nausea for up to 7 days. 7/19/17 7/26/17 Yes Edison Blackwell DO   metoprolol succinate (TOPROL-XL) 100 mg tablet Take 1 Tab by mouth daily. 4/11/17   Dina Broussard NP   pantoprazole (PROTONIX) 40 mg tablet Take 1 Tab by mouth two (2) times a day. 1/5/17   Gian Baker MD   raNITIdine (ZANTAC) 300 mg tablet Take 1 Tab by mouth daily. 1/5/17   Gian Baker MD   albuterol (PROVENTIL HFA, VENTOLIN HFA, PROAIR HFA) 90 mcg/actuation inhaler Take 2 Puffs by inhalation every four (4) hours as needed for Wheezing. 1/5/17   Gian Baker MD   sucralfate (CARAFATE) 100 mg/mL suspension Take  by mouth four (4) times daily. Take 10 mL by Mouth 4 Times a Day Before Meals & at Bedtime. Historical Provider   LYRICA 75 mg capsule  2/20/16   Historical Provider   oxyCODONE IR (OXY-IR) 15 mg immediate release tablet  3/8/16   Historical Provider   oxyCODONE ER (OXYCONTIN) 20 mg ER tablet  3/6/16   Historical Provider   cyclobenzaprine (FLEXERIL) 10 mg tablet  2/20/16   Historical Provider   fluticasone (FLONASE) 50 mcg/actuation nasal spray 2 Sprays by Both Nostrils route daily. 3/31/15   Gian Baker MD   cetirizine (ZYRTEC) 10 mg tablet Take  by mouth daily.     Historical Provider       Allergies   Allergen Reactions    Amoxicillin Other (comments)    Demerol [Meperidine] Hives and Swelling    Hydrocodone-Acetaminophen Nausea Only    Ketorolac Nausea Only    Pcn [Penicillins] Hives    Sulfa (Sulfonamide Antibiotics) Hives    Tramadol Other (comments)         Review of Systems  GENERAL: Patient alert, awake and oriented times 3, able to communicate full sentences and not in distress. HEENT: No change in vision, no earache, tinnitus, sore throat or sinus congestion. NECK: No pain or stiffness. PULMONARY: No shortness of breath, cough or wheeze. Cardiovascular: no pnd or orthopnea, no CP  GASTROINTESTINAL:chronic  abdominal pain,chronic  nausea, vomitingintermittent ddiarrhea,  [place] or bright red blood per rectum. GENITOURINARY: No urinary frequency, urgency, hesitancy or dysuria. MUSCULOSKELETAL: No joint or muscle pain, no back pain, no recent trauma. DERMATOLOGIC: No rash, no itching, no lesions. ENDOCRINE: No polyuria, polydipsia, no heat or cold intolerance. No recent change in weight. HEMATOLOGICAL: No anemia or easy bruising or bleeding. NEUROLOGIC: No headache, seizures, numbness, tingling or weakness. Physical Exam:     Physical Exam:  Visit Vitals    BP (!) 188/103 (BP 1 Location: Right arm, BP Patient Position: At rest;Supine)    Pulse 67    Temp 99.3 °F (37.4 °C)    Resp 24    SpO2 100%      O2 Device: Room air    Temp (24hrs), Av.7 °F (36.5 °C), Min:95.8 °F (35.4 °C), Max:99.3 °F (37.4 °C)             General:  Alert, cooperative, no distress, appears stated age. Head: Normocephalic, without obvious abnormality, atraumatic. Eyes:  Conjunctivae/corneas clear. PERRL, EOMs intact. Nose: Nares normal. No drainage or sinus tenderness. Neck: Supple, symmetrical, trachea midline, no adenopathy, thyroid: no enlargement, no carotid bruit and no JVD. Lungs:   Clear to auscultation bilaterally. Heart:  Regular rate and rhythm, S1, S2 normal.     Abdomen: Soft, non-tender. Bowel sounds normal.    Extremities: Extremities normal, atraumatic, no cyanosis or edema. Pulses: 2+ and symmetric all extremities. Skin:  No rashes or lesions   Neurologic: AAOx3, No focal motor or sensory deficit. Labs Reviewed:    Lab results reviewed.  For significant abnormal values and values requiring intervention, see assessment and plan. CT and EKG    Procedures/imaging: see electronic medical records for all procedures/Xrays and details which were not copied into this note but were reviewed prior to creation of Plan      Assessment/Plan     Active Problems:      Colitis (7/20/2017)  Check DAHIANA   Refer to GI endo and colonoscopy if possible   Clear liquid diet  Obtain UDS  Check abdominal ultrasound as well  zofran for nausea     Tobacco disorder  Nicotine patch    Chronic Pain   Check DAHIANA lyme b12 HIV     Hypokalemia  IVF with potassium               DVT/GI Prophylaxis: Lovenox    Discussed with patient at bedside about hospital admission and my plan care, who understood and agree with my plan care.     Maine Hallman MD  7/20/2017 4:26 AM

## 2017-07-20 NOTE — ED NOTES
TRANSFER - OUT REPORT:    Verbal report given to Laura Kowalski RN(name) on Cheyanne Reeves  being transferred to [address] [place] [address](unit) for routine progression of care    Report consisted of patients Situation, Background, Assessment and   Recommendations(SBAR). Information from the following report(s) SBAR, ED Summary, Procedure Summary, Intake/Output, MAR, Recent Results and Cardiac Rhythm NSR was reviewed with the receiving nurse. Opportunity for questions and clarification was provided.       Patient transported with:   [employer]

## 2017-07-20 NOTE — PROGRESS NOTES
conducted an initial consultation and Spiritual Assessment for Mavis Martin, who is a 33 y.o.,male. Patients Primary Language is: [place]. According to the patients EMR Confucianism Affiliation is: [place]. The reason the Patient came to the hospital is:   Patient Active Problem List    Diagnosis Date Noted    Colitis 07/20/2017    Abnormal bruising 04/19/2017    LBP (low back pain) 09/19/2013    Encounter for long-term (current) use of other medications 09/19/2013    Radiculitis 09/19/2013    Intervertebral disc extrusion 09/19/2013    ADHD (attention deficit hyperactivity disorder) 09/19/2013    Marijuana use 09/19/2013        The  provided the following Interventions:  Initiated a relationship of care and support. Listened empathically. Provided chaplaincy education. Provided information about Spiritual Care Services. Offered prayer and assurance of continued prayers on patient's behalf. Chart reviewed. The following outcomes where achieved:  Patient shared limited information about both their medical narrative and spiritual journey/beliefs. Patient processed feeling about current hospitalization.  assured patient of continued prayers.  left spiritual health care kit with patient. Patient expressed gratitude for 's visit. Assessment:  Patient does not have any Faith/cultural needs that will affect patients preferences in health care. There are no spiritual or Faith issues which require intervention at this time. Plan:  Chaplains will continue to follow and will provide pastoral care on an as needed/requested basis.  recommends bedside caregivers page  on duty if patient shows signs of acute spiritual or emotional distress.     [address] [place] [address]   [phone]

## 2017-07-20 NOTE — PROGRESS NOTES
Paged Dr. Declan Mendenhall: Patient still having N/v, not antiemetics ordered, also has po potassium ordered every 4 hours, patient unable to keep anything down at this time. BPs 160s/90s, nothing ordered for BP control. See new orders.

## 2017-07-20 NOTE — PROGRESS NOTES
Pt still feels nauseous. vomited coffee ground this am  labs, chart and vitals noted   Cont current Rx  Add HTN meds  RN with me during my visit with pt.      Visit Vitals    /88 (BP 1 Location: Right arm, BP Patient Position: At rest)    Pulse 97    Temp 100.4 °F (38 °C)    Resp 23    Ht 6' (1.829 m)    Wt 70.5 kg (155 lb 6.4 oz)    SpO2 97%    BMI 21.08 kg/m2

## 2017-07-20 NOTE — CONSULTS
[facility] 1946, [facility]   [email or web address]      Impression:   1. Chronic abd pain and n/v.  2. Refractory reflux. 3. Cannabinoid syndrome? 4. Abn CT, colits? Plan:     1. Cont his current meds and avoid marijuana use. Should follow-up with his primary GI, Dr. Lexus Rice upon DC. 2. CT stable, recent colo neg at [facility] per report. No additional w/u. Chief Complaint: n/v, abd pain      HPI:  Graham Hernandez is a 33 y.o. male who is being seen on consult for reflux, nausea and emesis. Followed by Dr. Lexus Rice in [place], last visit 4/5/17. Had EGD 7 mos ago and prior in 2014, results not available. Was referred to our group for manometry which has not been performed, pt failed to show for scheduled 5/1/17 appt. Uses marijuana, no other drugs admitted. Sense from primary hospitalist that there may be a narcotic seeking component. No bowel issues. CBC and LFTs normal.    PMH:   Past Medical History:   Diagnosis Date    Acid indigestion     ADHD (attention deficit hyperactivity disorder)     Asthma     Back pain     Heartburn     Herpes     HTN (hypertension)     IBS (irritable bowel syndrome)     Joint pain     Muscle pain     Muscle weakness     Trouble in sleeping        PSH:   Past Surgical History:   Procedure Laterality Date    HX BACK SURGERY  2013    HX ORTHOPAEDIC      RT HAND       Social HX:   Social History     Social History    Marital status: SINGLE     Spouse name: N/A    Number of children: N/A    Years of education: N/A     Occupational History    Not on file.      Social History Main Topics    Smoking status: Current Every Day Smoker     Packs/day: 0.50     Years: 13.00     Types: Cigarettes    Smokeless tobacco: Never Used    Alcohol use 1.0 oz/week     2 Standard drinks or equivalent per week      Comment: rare    Drug use: Yes     Special: Marijuana, Prescription, OTC    Sexual activity: No     Other Topics Concern    Not on file     Social History Narrative       FHX:   Family History   Problem Relation Age of Onset    Cancer Other      GRANDMOTHER, AND AUNT    Diabetes Other      GREAT GRANDFATHER    Heart Disease Other      AUNT    Hypertension Other      GRANDMOTHER, GRANDFATHER, MOTHER    Stroke Other     Immunodeficiency Other      ALL FAMILY MEMBERS       Allergy:   Allergies   Allergen Reactions    Amoxicillin Other (comments)    Demerol [Meperidine] Hives and Swelling    Hydrocodone-Acetaminophen Nausea Only    Ketorolac Nausea Only    Pcn [Penicillins] Hives    Sulfa (Sulfonamide Antibiotics) Hives    Tramadol Other (comments)       Home Medications:     Prescriptions Prior to Admission   Medication Sig    metoprolol succinate (TOPROL-XL) 100 mg tablet Take 1 Tab by mouth daily.  pantoprazole (PROTONIX) 40 mg tablet Take 1 Tab by mouth two (2) times a day.  raNITIdine (ZANTAC) 300 mg tablet Take 1 Tab by mouth daily.  albuterol (PROVENTIL HFA, VENTOLIN HFA, PROAIR HFA) 90 mcg/actuation inhaler Take 2 Puffs by inhalation every four (4) hours as needed for Wheezing.  sucralfate (CARAFATE) 100 mg/mL suspension Take  by mouth four (4) times daily. Take 10 mL by Mouth 4 Times a Day Before Meals & at Bedtime.  LYRICA 75 mg capsule     oxyCODONE IR (OXY-IR) 15 mg immediate release tablet     oxyCODONE ER (OXYCONTIN) 20 mg ER tablet     cyclobenzaprine (FLEXERIL) 10 mg tablet     fluticasone (FLONASE) 50 mcg/actuation nasal spray 2 Sprays by Both Nostrils route daily.  cetirizine (ZYRTEC) 10 mg tablet Take  by mouth daily. Review of Systems:     Constitutional: No fevers, chills, weight loss,+ fatigue. Skin: No rashes, pruritis, jaundice, ulcerations, erythema. HENT: No headaches, nosebleeds, sinus pressure, rhinorrhea, sore throat. Eyes: No visual changes, blurred vision, eye pain, photophobia, jaundice. Cardiovascular: No chest pain, heart palpitations.    Respiratory: No cough, SOB, wheezing, chest discomfort, orthopnea. Gastrointestinal: N/v, GERD   Genitourinary: No dysuria, bleeding, discharge, pyuria. Musculoskeletal: No weakness, arthralgias, wasting. Endo: No sweats. Heme: No bruising, easy bleeding. Allergies: As noted. Neurological: Cranial nerves intact. Alert and oriented. Gait not assessed. Psychiatric:  No anxiety, depression, hallucinations. Visit Vitals    BP (!) 163/105 (BP 1 Location: Right arm, BP Patient Position: At rest)    Pulse 77    Temp 99 °F (37.2 °C)    Resp 24    Ht 6' (1.829 m)    Wt 70.5 kg (155 lb 6.4 oz)    SpO2 99%    BMI 21.08 kg/m2       Physical Assessment:     constitutional: appearance: well developed, normal habitus, no deformities, in no acute distress. skin: inspection: no rashes, ulcers, icterus or other lesions; no clubbing or telangiectasias. palpation: no induration or subcutaneos nodules. eyes: inspection: normal conjunctivae and lids; no jaundice pupils: symmetrical, normoreactive to light, normal accommodation and size. ENMT: mouth: normal oral mucosa,lips and gums; good dentition. respiratory: effort: normal chest excursion; no intercostal retraction or accessory muscle use. cardiovascular: abdominal aorta: normal size and position; no bruits. palpation: PMI of normal size and position; normal rhythm; no thrill or murmurs. abdominal: abdomen: normal consistency; no tenderness or masses. hernias: no hernias appreciated. liver: normal size and consistency. spleen: not palpable. rectal: hemoccult/guaiac: not performed. musculoskeletal: digits and nails: not assessed. neurologic: cranial nerves: II-XII normal.   psychiatric: judgement/insight: within normal limits. memory: within normal limits for recent and remote events. mood and affect: no evidence of depression, anxiety or agitation. orientation: oriented to time, space and person.         Basic Metabolic Profile   Recent Labs      07/19/17 1900   NA  141   K  3.4*   CL  108   CO2  25   BUN  8   GLU  134*   CA  8.9         CBC w/Diff    Recent Labs      07/19/17 1900   WBC  12.0   RBC  4.93   HGB  14.7   HCT  42.7   MCV  86.6   MCH  29.8   MCHC  34.4   RDW  12.9   PLT  290    Recent Labs      07/19/17 1900   GRANS  86*   LYMPH  11*   EOS  0        Hepatic Function   Recent Labs      07/19/17 1900   ALB  4.4   TP  7.6   TBILI  0.6   SGOT  10*   AP  78   LPSE  120          Tasha Magallanes MD  [facility] [address] [place] 1946, [address] [facility]  [email or web address]

## 2017-07-20 NOTE — ROUTINE PROCESS
received patient per stretcher form ED. Patient transferred to bed per assist x 1. Patient became nauseous with vomiting. Patient denies abdominal pain at this time. Unable to get clear history from patient because of drowsiness. History obtained from grandfather.

## 2017-07-20 NOTE — ED NOTES
2330: I assumed care of this patient from Dr. Ketan Del Toro. Patient presented with abdominal pain nausea and vomiting. On evaluation patient is still actively vomiting into a bag, his abdomen has diffuse tenderness no guarding rebound or peritoneal signs. He is borderline bradycardic hypertensive. Labs reviewed largely unremarkable except for hypokalemia at 3.4, given his tenderness we'll plan for CT of the abdomen and pelvis. He had a CT scan done just recently but recently acute decompensation and will evaluate for a infectious complication since he had a colitis previously identified     reviewed, he has had a single provider prescribed him a total of 150 tablets of 15 mg oxycodone and 60 tablets of 20 mg OxyContin per month for the last 1 year      Patient has had repeated episodes of vomiting after being given Phenergan, Ativan, Benadryl    CT per radiology ascending colitis      Patient's presentation, history, physical exam and laboratory evaluations were reviewed. I felt the patient would benefit from inpatient management and treatment. Consult:  Discussed care with Dr. Latosha Macdonald. Standard discussion; including history of patients chief complaint, available diagnostic results, and treatment course. Patient was accepted to their service. Disposition:    Admitted to medicine service      Portions of this chart were created with Dragon medical speech to text program.   Unrecognized errors may be present.

## 2017-07-21 LAB
ALBUMIN SERPL BCP-MCNC: 3.5 G/DL (ref 3.4–5)
ALBUMIN/GLOB SERPL: 1.4 {RATIO} (ref 0.8–1.7)
ALP SERPL-CCNC: 61 U/L (ref 45–117)
ALT SERPL-CCNC: 14 U/L (ref 16–61)
ANA SER QL: NEGATIVE
ANION GAP BLD CALC-SCNC: 6 MMOL/L (ref 3–18)
AST SERPL W P-5'-P-CCNC: 5 U/L (ref 15–37)
B BURGDOR IGG+IGM SER-ACNC: <0.91 ISR (ref 0–0.9)
BACTERIA SPEC CULT: NORMAL
BASOPHILS # BLD AUTO: 0 K/UL (ref 0–0.1)
BASOPHILS # BLD: 0 % (ref 0–2)
BILIRUB SERPL-MCNC: 0.7 MG/DL (ref 0.2–1)
BUN SERPL-MCNC: 8 MG/DL (ref 7–18)
BUN/CREAT SERPL: 10 (ref 12–20)
CALCIUM SERPL-MCNC: 8 MG/DL (ref 8.5–10.1)
CHLORIDE SERPL-SCNC: 107 MMOL/L (ref 100–108)
CO2 SERPL-SCNC: 26 MMOL/L (ref 21–32)
CREAT SERPL-MCNC: 0.77 MG/DL (ref 0.6–1.3)
DIFFERENTIAL METHOD BLD: ABNORMAL
EOSINOPHIL # BLD: 0.1 K/UL (ref 0–0.4)
EOSINOPHIL NFR BLD: 1 % (ref 0–5)
ERYTHROCYTE [DISTWIDTH] IN BLOOD BY AUTOMATED COUNT: 12.8 % (ref 11.6–14.5)
GLOBULIN SER CALC-MCNC: 2.5 G/DL (ref 2–4)
GLUCOSE SERPL-MCNC: 127 MG/DL (ref 74–99)
HCT VFR BLD AUTO: 37.2 % (ref 36–48)
HCT VFR BLD AUTO: 37.7 % (ref 36–48)
HEMOCCULT STL QL: NEGATIVE
HGB BLD-MCNC: 12.6 G/DL (ref 13–16)
HGB BLD-MCNC: 12.7 G/DL (ref 13–16)
LIPASE SERPL-CCNC: 225 U/L (ref 73–393)
LYMPHOCYTES # BLD AUTO: 32 % (ref 21–52)
LYMPHOCYTES # BLD: 2.9 K/UL (ref 0.9–3.6)
MCH RBC QN AUTO: 29.7 PG (ref 24–34)
MCHC RBC AUTO-ENTMCNC: 33.9 G/DL (ref 31–37)
MCV RBC AUTO: 87.7 FL (ref 74–97)
MONOCYTES # BLD: 1.1 K/UL (ref 0.05–1.2)
MONOCYTES NFR BLD AUTO: 12 % (ref 3–10)
NEUTS SEG # BLD: 5 K/UL (ref 1.8–8)
NEUTS SEG NFR BLD AUTO: 55 % (ref 40–73)
PLATELET # BLD AUTO: 243 K/UL (ref 135–420)
PMV BLD AUTO: 10.7 FL (ref 9.2–11.8)
POTASSIUM SERPL-SCNC: 3.6 MMOL/L (ref 3.5–5.5)
PROT SERPL-MCNC: 6 G/DL (ref 6.4–8.2)
RBC # BLD AUTO: 4.24 M/UL (ref 4.7–5.5)
SEE BELOW:, 164879: NORMAL
SERVICE CMNT-IMP: NORMAL
SODIUM SERPL-SCNC: 139 MMOL/L (ref 136–145)
TSH SERPL DL<=0.05 MIU/L-ACNC: 0.85 UIU/ML (ref 0.36–3.74)
WBC # BLD AUTO: 9 K/UL (ref 4.6–13.2)

## 2017-07-21 PROCEDURE — 83690 ASSAY OF LIPASE: CPT | Performed by: INTERNAL MEDICINE

## 2017-07-21 PROCEDURE — 87493 C DIFF AMPLIFIED PROBE: CPT | Performed by: INTERNAL MEDICINE

## 2017-07-21 PROCEDURE — 74011250636 HC RX REV CODE- 250/636: Performed by: HOSPITALIST

## 2017-07-21 PROCEDURE — 82272 OCCULT BLD FECES 1-3 TESTS: CPT | Performed by: INTERNAL MEDICINE

## 2017-07-21 PROCEDURE — 84443 ASSAY THYROID STIM HORMONE: CPT | Performed by: INTERNAL MEDICINE

## 2017-07-21 PROCEDURE — 87045 FECES CULTURE AEROBIC BACT: CPT | Performed by: INTERNAL MEDICINE

## 2017-07-21 PROCEDURE — 36415 COLL VENOUS BLD VENIPUNCTURE: CPT | Performed by: INTERNAL MEDICINE

## 2017-07-21 PROCEDURE — 85025 COMPLETE CBC W/AUTO DIFF WBC: CPT | Performed by: INTERNAL MEDICINE

## 2017-07-21 PROCEDURE — 85018 HEMOGLOBIN: CPT | Performed by: HOSPITALIST

## 2017-07-21 PROCEDURE — 74011250637 HC RX REV CODE- 250/637: Performed by: INTERNAL MEDICINE

## 2017-07-21 PROCEDURE — 80053 COMPREHEN METABOLIC PANEL: CPT | Performed by: INTERNAL MEDICINE

## 2017-07-21 PROCEDURE — 87177 OVA AND PARASITES SMEARS: CPT | Performed by: INTERNAL MEDICINE

## 2017-07-21 PROCEDURE — 74011000258 HC RX REV CODE- 258: Performed by: HOSPITALIST

## 2017-07-21 PROCEDURE — 74011000250 HC RX REV CODE- 250: Performed by: HOSPITALIST

## 2017-07-21 PROCEDURE — 65270000029 HC RM PRIVATE

## 2017-07-21 PROCEDURE — 74011250636 HC RX REV CODE- 250/636: Performed by: INTERNAL MEDICINE

## 2017-07-21 RX ORDER — METOCLOPRAMIDE HYDROCHLORIDE 5 MG/ML
10 INJECTION INTRAMUSCULAR; INTRAVENOUS EVERY 6 HOURS
Status: DISCONTINUED | OUTPATIENT
Start: 2017-07-21 | End: 2017-07-22 | Stop reason: HOSPADM

## 2017-07-21 RX ORDER — PANTOPRAZOLE SODIUM 40 MG/1
40 TABLET, DELAYED RELEASE ORAL
Status: DISCONTINUED | OUTPATIENT
Start: 2017-07-21 | End: 2017-07-22 | Stop reason: HOSPADM

## 2017-07-21 RX ADMIN — OXYCODONE AND ACETAMINOPHEN 1 TABLET: 5; 325 TABLET ORAL at 20:49

## 2017-07-21 RX ADMIN — METOCLOPRAMIDE 10 MG: 5 INJECTION, SOLUTION INTRAMUSCULAR; INTRAVENOUS at 12:07

## 2017-07-21 RX ADMIN — METOCLOPRAMIDE 10 MG: 5 INJECTION, SOLUTION INTRAMUSCULAR; INTRAVENOUS at 18:14

## 2017-07-21 RX ADMIN — DEXTROSE MONOHYDRATE AND SODIUM CHLORIDE: 5; .9 INJECTION, SOLUTION INTRAVENOUS at 18:14

## 2017-07-21 RX ADMIN — OXYCODONE AND ACETAMINOPHEN 1 TABLET: 5; 325 TABLET ORAL at 16:01

## 2017-07-21 RX ADMIN — OXYCODONE AND ACETAMINOPHEN 1 TABLET: 5; 325 TABLET ORAL at 08:05

## 2017-07-21 RX ADMIN — ONDANSETRON 4 MG: 2 INJECTION INTRAMUSCULAR; INTRAVENOUS at 08:04

## 2017-07-21 RX ADMIN — PANTOPRAZOLE SODIUM 40 MG: 40 TABLET, DELAYED RELEASE ORAL at 16:01

## 2017-07-21 RX ADMIN — FAMOTIDINE 20 MG: 10 INJECTION, SOLUTION INTRAVENOUS at 09:52

## 2017-07-21 RX ADMIN — METOCLOPRAMIDE 10 MG: 5 INJECTION, SOLUTION INTRAMUSCULAR; INTRAVENOUS at 23:40

## 2017-07-21 RX ADMIN — DEXTROSE MONOHYDRATE AND SODIUM CHLORIDE: 5; .9 INJECTION, SOLUTION INTRAVENOUS at 08:00

## 2017-07-21 RX ADMIN — OXYCODONE AND ACETAMINOPHEN 1 TABLET: 5; 325 TABLET ORAL at 12:07

## 2017-07-21 NOTE — ROUTINE PROCESS
Bedside and Verbal shift change report given to PREM Peguero (oncoming nurse) by Aaron Santacruz RN (offgoing nurse). Report included the following information SBAR, Kardex, MAR and Recent Results.     SITUATION:    Code Status: Full Code   Reason for Admission: Colitis  [address] day: 1   Problem List:       Hospital Problems  Date Reviewed: 4/19/2017          Codes Class Noted POA    Colitis ICD-10-CM: K52.9  ICD-9-CM: 558.9  7/20/2017 Unknown              BACKGROUND:    Past Medical History:   Past Medical History:   Diagnosis Date    Acid indigestion     ADHD (attention deficit hyperactivity disorder)     Asthma     Back pain     Heartburn     Herpes     HTN (hypertension)     IBS (irritable bowel syndrome)     Joint pain     Muscle pain     Muscle weakness     Trouble in sleeping          Patient taking anticoagulants no     ASSESSMENT:    Changes in Assessment Throughout Shift: no     Patient has Central Line: no Reasons if yes: n/a   Patient has Mcneil Cath: no Reasons if yes: n/a      Last Vitals:     Vitals:    07/21/17 0436 07/21/17 0905 07/21/17 1207 07/21/17 1601   BP: 110/68 125/76 127/87 110/67   Pulse: 68 64 93 (!) 55   Resp: 20 18 18 18   Temp: 98.7 °F (37.1 °C) 96.7 °F (35.9 °C) 98.1 °F (36.7 °C) 98.8 °F (37.1 °C)   SpO2: 98% 99% 98% 99%   Weight:       Height:            IV and DRAINS (will only show if present)   [REMOVED] Peripheral IV 07/19/17 Left Antecubital-Site Assessment: Clean, dry, & intact  Peripheral IV 07/20/17 Right Hand-Site Assessment: Clean, dry, & intact     WOUND (if present)   Wound Type:  none   Dressing present Dressing Present : No   Wound Concerns/Notes:  none     PAIN    Pain Assessment    Pain Intensity 1: 7 (07/21/17 1700)    Pain Location 1: Abdomen    Pain Intervention(s) 1: Medication (see MAR)    Patient Stated Pain Goal: 0  o Interventions for Pain:  percocet  o Intervention effective: yes  o Time of last intervention: 1601 o Reassessment Completed: yes      Last 3 Weights:  Last 3 Recorded Weights in this Encounter    07/20/17 1042   Weight: 70.5 kg (155 lb 6.4 oz)     Weight change:      INTAKE/OUPUT    Current Shift:      Last three shifts: 07/20 0701 - 07/21 1900  In: 0999 [P.O.:980; I.V.:1375]  Out: 2626 [Urine:2425]     LAB RESULTS     Recent Labs      07/21/17   1535  07/21/17   0249  07/20/17   1652  07/19/17   1900   WBC   --   9.0  13.0  12.0   HGB  12.7*  12.6*  13.7  14.7   HCT  37.7  37.2  40.1  42.7   PLT   --   243  285  290        Recent Labs      07/21/17 0249 07/19/17   1900   NA  139  141   K  3.6  3.4*   GLU  127*  134*   BUN  8  8   CREA  0.77  0.83   CA  8.0*  8.9   INR   --   1.2       RECOMMENDATIONS AND DISCHARGE PLANNING     1. Pending tests/procedures/ Plan of Care or Other Needs:     2. Discharge plan for patient and Needs/Barriers: Home    3. Estimated Discharge Date: TBD Posted on Whiteboard in Patients Room: yes      4. The patient's care plan was reviewed with the oncoming nurse. \"HEALS\" SAFETY CHECK      Fall Risk    Total Score: 3    Safety Measures: Safety Measures: Bed/Chair-Wheels locked, Bed in low position, Fall prevention (comment)    A safety check occurred in the patient's room between off going nurse and oncoming nurse listed above.     The safety check included the below items  Area Items   H  High Alert Medications - Verify all high alert medication drips (heparin, PCA, etc.)   E  Equipment - Suction is set up for ALL patients (with yanker)  - Red plugs utilized for all equipment (IV pumps, etc.)  - WOWs wiped down at end of shift.  - Room stocked with oxygen, suction, and other unit-specific supplies   A  Alarms - Bed alarm is set for fall risk patients  - Ensure chair alarm is in place and activated if patient is up in a chair   L  Lines - Check IV for any infiltration  - Mcneil bag is empty if patient has a Mcneil   - Tubing and IV bags are labeled   S  Safety   - Room is clean, patient is clean, and equipment is clean. - Hallways are clear from equipment besides carts. - Fall bracelet on for fall risk patients  - Ensure room is clear and free of clutter  - Suction is set up for ALL patients (with george)  - Hallways are clear from equipment besides carts.    - Isolation precautions followed, supplies available outside room, sign posted     Karla Cross RN

## 2017-07-21 NOTE — PROGRESS NOTES
[facility] [place], [facility]  [email or web address]         Impression/Plan:  1. Nausea. Still w abd pain. Change to PPI BID and trial of Reglan for now. Recent EGD was neg, would not repeat unless overt GI bleeding. Chief Complaint: n/v      Subjective:  Still w symptoms, no overt bleeding. Hg 13.7 --> 12.6 w hydration.         Eyes: conjunctiva normal, EOM normal   Neck: ROM normal, supple and trachea normal   Cardiovascular: heart normal, intact distal pulses, normal rate and regular rhythm   Pulmonary/Chest Wall: breath sounds normal and effort normal   Abdominal: appearance normal, bowel sounds normal and soft, non-acute, non-tender                Visit Vitals    /76    Pulse 64    Temp 96.7 °F (35.9 °C)    Resp 18    Ht 6' (1.829 m)    Wt 70.5 kg (155 lb 6.4 oz)    SpO2 99%    BMI 21.08 kg/m2           Intake/Output Summary (Last 24 hours) at 07/21/17 1006  Last data filed at 07/21/17 0952   Gross per 24 hour   Intake             1955 ml   Output             1725 ml   Net              230 ml       CBC w/Diff    Lab Results   Component Value Date/Time    WBC 9.0 07/21/2017 02:49 AM    RBC 4.24 (L) 07/21/2017 02:49 AM    HGB 12.6 (L) 07/21/2017 02:49 AM    HCT 37.2 07/21/2017 02:49 AM    MCV 87.7 07/21/2017 02:49 AM    MCH 29.7 07/21/2017 02:49 AM    MCHC 33.9 07/21/2017 02:49 AM    RDW 12.8 07/21/2017 02:49 AM     07/21/2017 02:49 AM    Lab Results   Component Value Date/Time    GRANS 55 07/21/2017 02:49 AM    LYMPH 32 07/21/2017 02:49 AM    EOS 1 07/21/2017 02:49 AM    BASOS 0 07/21/2017 02:49 AM      Basic Metabolic Profile   Recent Labs      07/21/17   0249   NA  139   K  3.6   CL  107   CO2  26   BUN  8   CA  8.0*        Hepatic Function    Lab Results   Component Value Date/Time    ALB 3.5 07/21/2017 02:49 AM    TP 6.0 (L) 07/21/2017 02:49 AM    AP 61 07/21/2017 02:49 AM    Lab Results   Component Value Date/Time    SGOT 5 (L) 07/21/2017 02:49 AM        @[facility](CULT:3)                 )Lana Sol MD, MD  [facility], [facility]  www. [email or web address]

## 2017-07-21 NOTE — PROGRESS NOTES
[facility] Hospitalist Group  Progress Note    Patient: Barb Schroeder Age: 33 y.o. : 1985 MR#: [id number] SSN: [id number]  Date/Time: 2017     Subjective: pt still nauseous, vomited once this am, mild abd pain. C/o diarrhea, one episode. Still feels weak and tired       Assessment/Plan:   1. N/V: ? Gastroenteritis: cont symptomatic Rx  2. Coffee ground emesis: Gastroccult +, H/H stable, GI on case. Pt recently had EGD. Cont PPI and monitor   3. ? Colitis in CT sacn, CT in [facility] hospital  had similar findings. Pt had EGD and colonoscopy on (negative study)  4. HTN: BP better, cont clonidine   5. Chronic pain syndrome: per [facility] ? Drug seeking Hx. Home in 1-2 days if clinically better     Case discussed with:  [x]Patient  []Family  [x]Nursing  []Case Management  DVT Prophylaxis:  []Lovenox  []Hep SQ  [x]SCDs  []Coumadin   []On Heparin gtt    Objective:   VS:   Visit Vitals    /87    Pulse 93    Temp 98.1 °F (36.7 °C)    Resp 18    Ht 6' (1.829 m)    Wt 70.5 kg (155 lb 6.4 oz)    SpO2 98%    BMI 21.08 kg/m2      Tmax/24hrs: Temp (24hrs), Av °F (37.2 °C), Min:96.7 °F (35.9 °C), Max:100.4 °F (38 °C)  IOBRIEF  Intake/Output Summary (Last 24 hours) at 17 1339  Last data filed at 17 2668   Gross per 24 hour   Intake             1955 ml   Output             1725 ml   Net              230 ml       General:  Alert, cooperative, no acute distress    Pulmonary:  CTA Bilaterally. Cardiovascular: Regular rate and Rhythm. GI:  Soft, Non distended, Non tender. + Bowel sounds. Extremities:  No edema, cyanosis, clubbing. Psych: Good insight. Not anxious or agitated. Neurologic: Alert and oriented X 3. No acute neuro deficits.       Medications:   Current Facility-Administered Medications   Medication Dose Route Frequency    pantoprazole (PROTONIX) tablet 40 mg  40 mg Oral ACB&D    metoclopramide HCl (REGLAN) injection 10 mg  10 mg IntraVENous Q6H    acetaminophen (TYLENOL) tablet 650 mg  650 mg Oral Q4H PRN    oxyCODONE-acetaminophen (PERCOCET) 5-325 mg per tablet 1 Tab  1 Tab Oral Q4H PRN    ondansetron (ZOFRAN) injection 4 mg  4 mg IntraVENous Q6H PRN    dextrose 5% and 0.9% NaCl 1,000 mL with potassium chloride 20 mEq infusion   IntraVENous CONTINUOUS    cloNIDine (CATAPRES) 0.3 mg/24 hr patch 1 Patch  1 Patch TransDERmal Q7D    hydrALAZINE (APRESOLINE) 20 mg/mL injection 10 mg  10 mg IntraVENous Q6H PRN       Labs:    Recent Results (from the past 24 hour(s))   CBC WITH AUTOMATED DIFF    Collection Time: 07/20/17  4:52 PM   Result Value Ref Range    WBC 13.0 4.6 - 13.2 K/uL    RBC 4.60 (L) 4.70 - 5.50 M/uL    HGB 13.7 13.0 - 16.0 g/dL    HCT 40.1 36.0 - 48.0 %    MCV 87.2 74.0 - 97.0 FL    MCH 29.8 24.0 - 34.0 PG    MCHC 34.2 31.0 - 37.0 g/dL    RDW 12.9 11.6 - 14.5 %    PLATELET 559 429 - 290 K/uL    MPV 10.5 9.2 - 11.8 FL    NEUTROPHILS 69 40 - 73 %    LYMPHOCYTES 19 (L) 21 - 52 %    MONOCYTES 11 (H) 3 - 10 %    EOSINOPHILS 1 0 - 5 %    BASOPHILS 0 0 - 2 %    ABS. NEUTROPHILS 9.0 (H) 1.8 - 8.0 K/UL    ABS. LYMPHOCYTES 2.5 0.9 - 3.6 K/UL    ABS. MONOCYTES 1.4 (H) 0.05 - 1.2 K/UL    ABS. EOSINOPHILS 0.1 0.0 - 0.4 K/UL    ABS. BASOPHILS 0.1 0.0 - 0.1 K/UL    DF AUTOMATED     CBC WITH AUTOMATED DIFF    Collection Time: 07/21/17  2:49 AM   Result Value Ref Range    WBC 9.0 4.6 - 13.2 K/uL    RBC 4.24 (L) 4.70 - 5.50 M/uL    HGB 12.6 (L) 13.0 - 16.0 g/dL    HCT 37.2 36.0 - 48.0 %    MCV 87.7 74.0 - 97.0 FL    MCH 29.7 24.0 - 34.0 PG    MCHC 33.9 31.0 - 37.0 g/dL    RDW 12.8 11.6 - 14.5 %    PLATELET 855 125 - 082 K/uL    MPV 10.7 9.2 - 11.8 FL    NEUTROPHILS 55 40 - 73 %    LYMPHOCYTES 32 21 - 52 %    MONOCYTES 12 (H) 3 - 10 %    EOSINOPHILS 1 0 - 5 %    BASOPHILS 0 0 - 2 %    ABS. NEUTROPHILS 5.0 1.8 - 8.0 K/UL    ABS. LYMPHOCYTES 2.9 0.9 - 3.6 K/UL    ABS. MONOCYTES 1.1 0.05 - 1.2 K/UL    ABS. EOSINOPHILS 0.1 0.0 - 0.4 K/UL    ABS. BASOPHILS 0.0 0.0 - 0.1 K/UL    DF AUTOMATED     LIPASE    Collection Time: 07/21/17  2:49 AM   Result Value Ref Range    Lipase 225 73 - 396 U/L   METABOLIC PANEL, COMPREHENSIVE    Collection Time: 07/21/17  2:49 AM   Result Value Ref Range    Sodium 139 136 - 145 mmol/L    Potassium 3.6 3.5 - 5.5 mmol/L    Chloride 107 100 - 108 mmol/L    CO2 26 21 - 32 mmol/L    Anion gap 6 3.0 - 18 mmol/L    Glucose 127 (H) 74 - 99 mg/dL    BUN 8 7.0 - 18 MG/DL    Creatinine 0.77 0.6 - 1.3 MG/DL    BUN/Creatinine ratio 10 (L) 12 - 20      GFR est AA >60 >60 ml/min/1.73m2    GFR est non-AA >60 >60 ml/min/1.73m2    Calcium 8.0 (L) 8.5 - 10.1 MG/DL    Bilirubin, total 0.7 0.2 - 1.0 MG/DL    ALT (SGPT) 14 (L) 16 - 61 U/L    AST (SGOT) 5 (L) 15 - 37 U/L    Alk.  phosphatase 61 45 - 117 U/L    Protein, total 6.0 (L) 6.4 - 8.2 g/dL    Albumin 3.5 3.4 - 5.0 g/dL    Globulin 2.5 2.0 - 4.0 g/dL    A-G Ratio 1.4 0.8 - 1.7     TSH 3RD GENERATION    Collection Time: 07/21/17  2:49 AM   Result Value Ref Range    TSH 0.85 0.36 - 3.74 uIU/mL       Signed By: Han Brown MD     July 21, 2017

## 2017-07-21 NOTE — PROGRESS NOTES
Bedside and Verbal shift change report given to Rey Villa RN (oncoming nurse) by Manjinder Laird RN (offgoing nurse). Report included the following information SBAR, Kardex, MAR and Recent Results. SITUATION:  Code Status: Full Code  Reason for Admission: Colitis  Colitis  Hospital day: 1  Problem List:       Hospital Problems  Date Reviewed: 4/19/2017          Codes Class Noted POA    Colitis ICD-10-CM: K52.9  ICD-9-CM: 558.9  7/20/2017 Unknown              BACKGROUND:   Past Medical History:   Past Medical History:   Diagnosis Date    Acid indigestion     ADHD (attention deficit hyperactivity disorder)     Asthma     Back pain     Heartburn     Herpes     HTN (hypertension)     IBS (irritable bowel syndrome)     Joint pain     Muscle pain     Muscle weakness     Trouble in sleeping       Patient taking anticoagulants no    Patient has a defibrillator: no    History of shots YES for example, flu, pneumonia, tetanus   Isolation History YES for example, MRSA, CDiff    ASSESSMENT:  Changes in Assessment Throughout Shift: NONE  Significant Changes in 24 hours (for example, RR/code, fall)  Patient has Central Line: no Reasons if yes:   Patient has Mcneil Cath: no Reasons if yes:     Mobility Issues  PT  IV Patency  OR Checklist  Pending Tests    Last Vitals:  Vitals w/ MEWS Score (last day)     Date/Time MEWS Score Pulse Resp Temp BP Level of Consciousness SpO2    07/21/17 0000 2 81 21 99.2 °F (37.3 °C) (!)  158/99 Alert 97 %    07/20/17 2330 -- -- -- -- -- Alert --    07/20/17 2219 -- -- -- 99.3 °F (37.4 °C) (!)  155/98 -- --    07/20/17 2000 2 66 21 100.4 °F (38 °C) (!)  156/108 Alert 99 %    07/20/17 1703 2 97 23 100.4 °F (38 °C) 144/88 Alert 97 %    07/20/17 1109 2 77 24 99 °F (37.2 °C) (!)  163/105 Alert 99 %    07/20/17 0730 2 90 22 97.8 °F (36.6 °C) (!)  164/97 Alert 98 %    07/20/17 0342 2 67 24 99.3 °F (37.4 °C) (!)  188/103 Alert 100 %    07/20/17 0245 -- 82 26 -- (!)  161/102 -- 98 % 07/20/17 0215 -- 94 11 -- (!)  164/103 -- 99 %    07/20/17 0200 -- 81 22 -- (!)  163/109 -- 99 %    07/20/17 0100 -- 93 19 -- (!)  170/107 -- 100 %    07/20/17 0045 -- 90 21 -- (!)  163/112 -- 99 %    07/20/17 0037 -- -- -- -- (!)  165/103 -- --    07/20/17 0000 -- 83 21 -- (!)  173/107 -- 98 %            PAIN    Pain Assessment    Pain Intensity 1: 7 (07/21/17 0000)    Pain Location 1: Abdomen, Back    Pain Intervention(s) 1: Medication (see MAR)    Patient Stated Pain Goal: 0  Intervention effective: yes  Time of last intervention: 07/21/17 Reassessment Completed: yes   Other actions taken for pain: distraction    Last 3 Weights:  Last 3 Recorded Weights in this Encounter    07/20/17 1042   Weight: 70.5 kg (155 lb 6.4 oz)   Weight change:     INTAKE/OUPUT    Current Shift: 07/20 1901 - 07/21 0700  In: -   Out: 500 [Urine:500]    Last three shifts: 07/19 0701 - 07/20 1900  In: 1925 [P.O.:550; I.V.:1375]  Out: 950 [Urine:750]    RECOMMENDATIONS AND DISCHARGE PLANNING  Patient needs and requests: pain control    Pending tests/procedures: yes     Discharge plan for patient: home    Discharge planning Needs or Barriers: none    Estimated Discharge Date: TBD Posted on Whiteboard in Patients Room: yes       \"HEALS\" SAFETY CHECK  A safety check occurred in the patient's room between off going nurse and oncoming nurse listed above. The safety check included the below items:    H  High Alert Medications Verify all high alert medication drips (heparin, PCA, etc.)  E  Equipment Suction is set up for ALL patients (with tiffanyker)  Red plugs utilized for all equipment (IV pumps, etc.)  WOWs wiped down at end of shift.   Room stocked with oxygen, suction, and other unit-specific supplies  A  Alarms Bed alarm is set for fall risk patients  Ensure chair alarm is in place and activated if patient is up in a chair  L  Lines Check IV for any infiltration  Mcneil bag is empty if patient has a Mcneil   Tubing and IV bags are labeled  S  Safety  Room is clean, patient is clean, and equipment is clean. Hallways are clear from equipment besides carts. Fall bracelet on for fall risk patients  Ensure room is clear and free of clutter  Suction is set up for ALL patients (with george)  Hallways are clear from equipment besides carts.    Isolation precautions followed, supplies available outside room, sign posted    Case Pate RN

## 2017-07-22 VITALS
DIASTOLIC BLOOD PRESSURE: 75 MMHG | HEART RATE: 64 BPM | OXYGEN SATURATION: 99 % | WEIGHT: 155.4 LBS | RESPIRATION RATE: 16 BRPM | HEIGHT: 72 IN | BODY MASS INDEX: 21.05 KG/M2 | SYSTOLIC BLOOD PRESSURE: 119 MMHG | TEMPERATURE: 97.1 F

## 2017-07-22 LAB
HCT VFR BLD AUTO: 36.6 % (ref 36–48)
HGB BLD-MCNC: 12.2 G/DL (ref 13–16)

## 2017-07-22 PROCEDURE — 74011250637 HC RX REV CODE- 250/637: Performed by: INTERNAL MEDICINE

## 2017-07-22 PROCEDURE — 74011250636 HC RX REV CODE- 250/636: Performed by: INTERNAL MEDICINE

## 2017-07-22 PROCEDURE — 74011000258 HC RX REV CODE- 258: Performed by: HOSPITALIST

## 2017-07-22 PROCEDURE — 36415 COLL VENOUS BLD VENIPUNCTURE: CPT | Performed by: HOSPITALIST

## 2017-07-22 PROCEDURE — 85018 HEMOGLOBIN: CPT | Performed by: HOSPITALIST

## 2017-07-22 PROCEDURE — 74011250636 HC RX REV CODE- 250/636: Performed by: HOSPITALIST

## 2017-07-22 RX ORDER — METOCLOPRAMIDE 10 MG/1
10 TABLET ORAL
Qty: 40 TAB | Refills: 0 | Status: SHIPPED | OUTPATIENT
Start: 2017-07-22 | End: 2017-08-01

## 2017-07-22 RX ORDER — PANTOPRAZOLE SODIUM 40 MG/1
40 TABLET, DELAYED RELEASE ORAL
Qty: 60 TAB | Refills: 0 | Status: SHIPPED | OUTPATIENT
Start: 2017-07-22 | End: 2017-07-26 | Stop reason: SDUPTHER

## 2017-07-22 RX ORDER — PROMETHAZINE HYDROCHLORIDE 25 MG/1
25 SUPPOSITORY RECTAL
Qty: 15 SUPPOSITORY | Refills: 0 | Status: SHIPPED | OUTPATIENT
Start: 2017-07-22 | End: 2017-07-26 | Stop reason: SDUPTHER

## 2017-07-22 RX ORDER — OXYCODONE AND ACETAMINOPHEN 5; 325 MG/1; MG/1
1 TABLET ORAL
Qty: 10 TAB | Refills: 0 | Status: SHIPPED | OUTPATIENT
Start: 2017-07-22 | End: 2021-11-01 | Stop reason: ALTCHOICE

## 2017-07-22 RX ADMIN — OXYCODONE AND ACETAMINOPHEN 1 TABLET: 5; 325 TABLET ORAL at 11:32

## 2017-07-22 RX ADMIN — OXYCODONE AND ACETAMINOPHEN 1 TABLET: 5; 325 TABLET ORAL at 01:20

## 2017-07-22 RX ADMIN — OXYCODONE AND ACETAMINOPHEN 1 TABLET: 5; 325 TABLET ORAL at 06:37

## 2017-07-22 RX ADMIN — METOCLOPRAMIDE 10 MG: 5 INJECTION, SOLUTION INTRAMUSCULAR; INTRAVENOUS at 06:31

## 2017-07-22 RX ADMIN — METOCLOPRAMIDE 10 MG: 5 INJECTION, SOLUTION INTRAMUSCULAR; INTRAVENOUS at 11:32

## 2017-07-22 RX ADMIN — DEXTROSE MONOHYDRATE AND SODIUM CHLORIDE: 5; .9 INJECTION, SOLUTION INTRAVENOUS at 03:29

## 2017-07-22 RX ADMIN — PANTOPRAZOLE SODIUM 40 MG: 40 TABLET, DELAYED RELEASE ORAL at 06:31

## 2017-07-22 NOTE — DISCHARGE INSTRUCTIONS
Call your doctor or come to the emergency room, if you have continued vomiting, if you are unable to tolerate any food by mouth, if you notice blood in your vomitus or something that looks like ground coffee or if you have other concerns. Do not take the phenergan suppository around the same time you take reglan and skew the dosing time such that there are at least 6 hours before the time you take one or the other. Abdominal Pain: Care Instructions  Your Care Instructions    Abdominal pain has many possible causes. Some aren't serious and get better on their own in a few days. Others need more testing and treatment. If your pain continues or gets worse, you need to be rechecked and may need more tests to find out what is wrong. You may need surgery to correct the problem. Don't ignore new symptoms, such as fever, nausea and vomiting, urination problems, pain that gets worse, and dizziness. These may be signs of a more serious problem. Your doctor may have recommended a follow-up visit in the next 8 to 12 hours. If you are not getting better, you may need more tests or treatment. The doctor has checked you carefully, but problems can develop later. If you notice any problems or new symptoms, get medical treatment right away. Follow-up care is a key part of your treatment and safety. Be sure to make and go to all appointments, and call your doctor if you are having problems. It's also a good idea to know your test results and keep a list of the medicines you take. How can you care for yourself at home? · Rest until you feel better. · To prevent dehydration, drink plenty of fluids, enough so that your urine is light yellow or clear like water. Choose water and other caffeine-free clear liquids until you feel better. If you have kidney, heart, or liver disease and have to limit fluids, talk with your doctor before you increase the amount of fluids you drink.   · If your stomach is upset, eat mild foods, such as rice, dry toast or crackers, bananas, and applesauce. Try eating several small meals instead of two or three large ones. · Wait until 48 hours after all symptoms have gone away before you have spicy foods, alcohol, and drinks that contain caffeine. · Do not eat foods that are high in fat. · Avoid anti-inflammatory medicines such as aspirin, ibuprofen (Advil, Motrin), and naproxen (Aleve). These can cause stomach upset. Talk to your doctor if you take daily aspirin for another health problem. When should you call for help? Call 911 anytime you think you may need emergency care. For example, call if:  · You passed out (lost consciousness). · You pass maroon or very bloody stools. · You vomit blood or what looks like coffee grounds. · You have new, severe belly pain. Call your doctor now or seek immediate medical care if:  · Your pain gets worse, especially if it becomes focused in one area of your belly. · You have a new or higher fever. · Your stools are black and look like tar, or they have streaks of blood. · You have unexpected vaginal bleeding. · You have symptoms of a urinary tract infection. These may include:  ¨ Pain when you urinate. ¨ Urinating more often than usual.  ¨ Blood in your urine. · You are dizzy or lightheaded, or you feel like you may faint. Watch closely for changes in your health, and be sure to contact your doctor if:  · You are not getting better after 1 day (24 hours). Where can you learn more? Go to http://roosevelt-angelica.info/. Enter I217 in the search box to learn more about \"Abdominal Pain: Care Instructions. \"  Current as of: March 20, 2017  Content Version: 11.3  © 9966-8179 [employer]. Care instructions adapted under license by [employer] (which disclaims liability or warranty for this information).  If you have questions about a medical condition or this instruction, always ask your healthcare professional. Janelle Lawson, [facility] disclaims any warranty or liability for your use of this information. Nausea and Vomiting: Care Instructions  Your Care Instructions    When you are nauseated, you may feel weak and sweaty and notice a lot of saliva in your mouth. Nausea often leads to vomiting. Most of the time you do not need to worry about nausea and vomiting, but they can be signs of other illnesses. Two common causes of nausea and vomiting are stomach flu and food poisoning. Nausea and vomiting from viral stomach flu will usually start to improve within 24 hours. Nausea and vomiting from food poisoning may last from 12 to 48 hours. The doctor has checked you carefully, but problems can develop later. If you notice any problems or new symptoms, get medical treatment right away. Follow-up care is a key part of your treatment and safety. Be sure to make and go to all appointments, and call your doctor if you are having problems. It's also a good idea to know your test results and keep a list of the medicines you take. How can you care for yourself at home? · To prevent dehydration, drink plenty of fluids, enough so that your urine is light yellow or clear like water. Choose water and other caffeine-free clear liquids until you feel better. If you have kidney, heart, or liver disease and have to limit fluids, talk with your doctor before you increase the amount of fluids you drink. · Rest in bed until you feel better. · When you are able to eat, try clear soups, mild foods, and liquids until all symptoms are gone for 12 to 48 hours. Other good choices include dry toast, crackers, cooked cereal, and gelatin dessert, such as Jell-O. When should you call for help? Call 911 anytime you think you may need emergency care. For example, call if:  · You passed out (lost consciousness). Call your doctor now or seek immediate medical care if:  · You have symptoms of dehydration, such as:  ¨ Dry eyes and a dry mouth.   ¨ Passing only a little dark urine. ¨ Feeling thirstier than usual.  · You have new or worsening belly pain. · You have a new or higher fever. · You vomit blood or what looks like coffee grounds. Watch closely for changes in your health, and be sure to contact your doctor if:  · You have ongoing nausea and vomiting. · Your vomiting is getting worse. · Your vomiting lasts longer than 2 days. · You are not getting better as expected. Where can you learn more? Go to http://roosevelt-angelica.info/. Enter 25 [id number] in the search box to learn more about \"Nausea and Vomiting: Care Instructions. \"  Current as of: March 20, 2017  Content Version: 11.3  © 6743-6130 [employer]. Care instructions adapted under license by [employer] (which disclaims liability or warranty for this information). If you have questions about a medical condition or this instruction, always ask your healthcare professional. [place] [address] any warranty or liability for your use of this information. Opioid Withdrawal: Care Instructions  Your Care Instructions  Opioids are strong pain medicines. Examples include hydrocodone, oxycodone, fentanyl, and morphine. Heroin is an example of an illegal opioid. Your body gets used to this type of drug if you take it all the time. This is called being dependent on the drug. And when you stop taking it, you go through withdrawal.  Withdrawal symptoms can include nausea, sweating, chills, diarrhea, stomach cramps, and muscle aches. Withdrawal can last up to several weeks, depending on which drug you took. You may feel very ill, but you are probably not in medical danger. Withdrawal isn't easy, but there are things you can do to help you cope with the symptoms. You will feel a little bit better each day as your body adjusts and heals itself. Follow-up care is a key part of your treatment and safety.  Be sure to make and go to all appointments, and call your doctor if you are having problems. It's also a good idea to know your test results and keep a list of the medicines you take. How can you care for yourself at home? · Your doctor may give you medicine to help you feel better. Read and follow all instructions on the label. · To help get through withdrawal, you can also:  ¨ Get plenty of rest.  ¨ Drink plenty of fluids. ¨ Stay active, but don't tire yourself. ¨ Eat a healthy diet. · Do not drink alcohol or take illegal drugs. · Talk to your doctor about drug treatment programs to help you stay drug-free. · Talk to your doctor or pharmacist about having a naloxone rescue kit on hand. When should you call for help? Call 911 anytime you think you may need emergency care. For example, call if:  · You feel you cannot stop from hurting yourself or someone else. Call your doctor now or seek immediate medical care if:  · You have new or worse withdrawal symptoms that you can't manage at home, such as:  ¨ Stomach cramps. ¨ Vomiting. ¨ Diarrhea. ¨ Muscle aches. ¨ Sweating. Watch closely for changes in your health, and be sure to contact your doctor if:  · You do not get better as expected. Where can you learn more? Go to http://roosevelt-angelica.info/. Enter E242 in the search box to learn more about \"Opioid Withdrawal: Care Instructions. \"  Current as of: February 21, 2017  Content Version: 11.3  © 9955-0676 [employer], [employer]. Care instructions adapted under license by [facility] (which disclaims liability or warranty for this information). If you have questions about a medical condition or this instruction, always ask your healthcare professional. [place] [address] any warranty or liability for your use of this information.

## 2017-07-22 NOTE — ROUTINE PROCESS
Bedside and Verbal shift change report given to Naz Fuentes RN (oncoming nurse) by Rubens Aguirre RN (offgoing nurse). Report included the following information SBAR, Kardex, MAR and Recent Results.     SITUATION:    Code Status: Full Code  Reason for Admission: Colitis  [address] day: 2   Problem List:       Hospital Problems  Date Reviewed: 4/19/2017          Codes Class Noted POA    Colitis ICD-10-CM: K52.9  ICD-9-CM: 558.9  7/20/2017 Unknown              BACKGROUND:    Past Medical History:   Past Medical History:   Diagnosis Date    Acid indigestion     ADHD (attention deficit hyperactivity disorder)     Asthma     Back pain     Heartburn     Herpes     HTN (hypertension)     IBS (irritable bowel syndrome)     Joint pain     Muscle pain     Muscle weakness     Trouble in sleeping          Patient taking anticoagulants no     ASSESSMENT:    Changes in Assessment Throughout Shift: no     Patient has Central Line: no Reasons if yes: n/a   Patient has Mcneil Cath: no Reasons if yes: n/a      Last Vitals:     Vitals:    07/21/17 1601 07/21/17 1930 07/21/17 2342 07/22/17 0336   BP: 110/67 119/67 123/72 107/66   Pulse: (!) 55 60 (!) 57 61   Resp: 18 16 16 16   Temp: 98.8 °F (37.1 °C) 97.4 °F (36.3 °C) 97.9 °F (36.6 °C) 97.1 °F (36.2 °C)   SpO2: 99% 99% 98% 100%   Weight:       Height:            IV and DRAINS (will only show if present)   [REMOVED] Peripheral IV 07/19/17 Left Antecubital-Site Assessment: Clean, dry, & intact  Peripheral IV 07/20/17 Right Hand-Site Assessment: Clean, dry, & intact     WOUND (if present)   Wound Type:  none   Dressing present Dressing Present : No   Wound Concerns/Notes:  none     PAIN    Pain Assessment    Pain Intensity 1: 6 (07/22/17 0150)    Pain Location 1: Back    Pain Intervention(s) 1: Medication (see MAR)    Patient Stated Pain Goal: 0  o Interventions for Pain:  percocet  o Intervention effective: yes  o Time of last intervention: 07/22/17  o Reassessment Completed: yes      Last 3 Weights:  Last 3 Recorded Weights in this Encounter    07/20/17 1042   Weight: 70.5 kg (155 lb 6.4 oz)     Weight change:      INTAKE/OUPUT    Current Shift: 07/21 1901 - 07/22 0700  In: -   Out: 700 [Urine:700]    Last three shifts: 07/20 0701 - 07/21 1900  In: 5936 [P.O.:980; I.V.:1375]  Out: 2626 [Urine:2425]     LAB RESULTS     Recent Labs      07/22/17   0246  07/21/17   1535  07/21/17   0249  07/20/17   1652  07/19/17 1900   WBC   --    --   9.0  13.0  12.0   HGB  12.2*  12.7*  12.6*  13.7  14.7   HCT  36.6  37.7  37.2  40.1  42.7   PLT   --    --   243  285  290        Recent Labs      07/21/17   0249 07/19/17 1900   NA  139  141   K  3.6  3.4*   GLU  127*  134*   BUN  8  8   CREA  0.77  0.83   CA  8.0*  8.9   INR   --   1.2       RECOMMENDATIONS AND DISCHARGE PLANNING     1. Pending tests/procedures/ Plan of Care or Other Needs:     2. Discharge plan for patient and Needs/Barriers: Home    3. Estimated Discharge Date: 7/24/17 Posted on Whiteboard in Hospitals in [place]: yes      4. The patient's care plan was reviewed with the oncoming nurse. \"HEALS\" SAFETY CHECK      Fall Risk    Total Score: 2    Safety Measures: Safety Measures: Bed/Chair-Wheels locked, Bed in low position, Call light within reach, Fall prevention (comment)    A safety check occurred in the patient's room between off going nurse and oncoming nurse listed above.     The safety check included the below items  Area Items   H  High Alert Medications - Verify all high alert medication drips (heparin, PCA, etc.)   E  Equipment - Suction is set up for ALL patients (with yanker)  - Red plugs utilized for all equipment (IV pumps, etc.)  - WOWs wiped down at end of shift.  - Room stocked with oxygen, suction, and other unit-specific supplies   A  Alarms - Bed alarm is set for fall risk patients  - Ensure chair alarm is in place and activated if patient is up in a chair   L  Lines - Check IV for any infiltration  - Mcneil bag is empty if patient has a Mcneil   - Tubing and IV bags are labeled   S  Safety   - Room is clean, patient is clean, and equipment is clean. - Hallways are clear from equipment besides carts. - Fall bracelet on for fall risk patients  - Ensure room is clear and free of clutter  - Suction is set up for ALL patients (with yanker)  - Hallways are clear from equipment besides carts.    - Isolation precautions followed, supplies available outside room, sign posted     Melbourne Schilder, RN

## 2017-07-22 NOTE — PROGRESS NOTES
Patient discharge home, IV taken out, all discharge information, education and prescriptions gone over with and given to patient.

## 2017-07-24 LAB
BACTERIA SPEC CULT: NORMAL
SERVICE CMNT-IMP: NORMAL

## 2017-07-24 NOTE — DISCHARGE SUMMARY
[facility]  [address] [place] SUMMARY    Name:  Sonia Estrella  MR#:  [id number]  :  1985  Account #:  [de-identified]  Date of Adm:  2017  Date of Discharge:  2017      CONSULTS: Dr. Tonya Wild, Gastroenterology. DISCHARGE DIAGNOSES:  1. Recurrent nausea, vomiting. Cause unclear. Question if he has any  type of gastroparesis, although the cause would not be clear at this  time. The patient has been had been seen by GI with no endoscopy  workup. Given normal endoscopy workup in the recent past.  2. Hypokalemia, resolved. Normal magnesium. 3. Chronic pain syndrome. 4. Hypertension. PROCEDURES DONE: None. STUDIES OF SIGNIFICANCE:  1. CT scan of the abdomen and pelvis with contrast showed thickening  of the ascending colon, concerning for ascending colitis. 2. X-ray of the abdomen acute, done on 2017 showed no  definite acute findings,paucity of abdominal abdominal bowel gas may  be seen with fluid-filled bowel loops. Obstruction unlikely given lack of  gastric distention and presence of air within the rectum. 3. He had an ultrasound of the abdomen, which showed that it was  unremarkable, limited abdominal ultrasound. REASON FOR HOSPITALIZATION: Please see the H and P dictated  by Dr. Jocelyn Dale. Briefly, the patient is a 20-year-old  male with  history of recurrent abdominal pain, nausea and vomiting, who came  into the ER with chief complaint of abdominal pain and vomiting. He  had complained of having nausea and vomiting and intermittent  diarrhea over the 3 weeks prior to his presentation after weaning  himself off of Oxycodone and OxyContin. He denied sick contacts. He  was admitted with the impression that he had colitis, chronic pain,  hypokalemia.     HOSPITALIZATION COURSE: The patient was seen by the  gastroenterology service with the impression that he had chronic  abdominal pain and nausea and vomiting refractory reflux, possible  cannabinoid syndrome. Question colitis. There is also documentation  by the GI physician that the patient had a recent colonoscopy that was  negative at [facility] with plans for no additional workup. The patient  continued to do okay other than complaining of nausea the day prior to  his discharge, and had vomited coffee-ground emesis yesterday  morning. On date of discharge, he reports that although he feels  nauseous he has not had further vomiting. His hemoglobin remains  stable with a level checked on the 19th at 14 and it has trended down  to 12.2; however, compared to the last few days, he has remained  stable between 12.3 and 12.7. PHYSICAL EXAMINATION  VITAL SIGNS: On date of discharge, he is afebrile with stable vital  signs with the exception of bradycardia, hemodynamically stable,  afebrile. ABDOMEN: Soft, nondistended. have right upper quadrant pain, without  rebound. Positive bowel sounds. No guarding. HEART: Regular rate and rhythm. No tachycardia. No murmurs. LUNGS: Clear. EXTREMITIES; No peripheral edema. SKIN: The patient has petechiae on the proximal aspect of upper  extremities, right greater than left. The patient reports that this is a  recurrent issue since he was very young. DISCHARGE MEDICATIONS:  1. Phenergan suppository p.r.n. nausea or vomiting. New medication. 2. Reglan 10 mg before meals and at night. New medication. I have  instructed the patient not to take the Phenergan and Reglan at the  same time, he has expressed understanding. I have also spoken to the  pharmacist about interactions. Per pharmacist as long as the  Phenergan is p.r.n. the regimen should be okay. I have clearly  explained to the patient that there are long-term side effects,  movement disorders that are irreversible caused by Reglan and for the  patient not to continue to take this medication on an indefinite basis. He has been given a 10-day course. 3. Percocet for chronic abdominal pain.   4. Protonix, which is new 40 mg twice a day as recommended by  gastroenterologist.  5. The patient has been instructed to continue Toprol-XL. 6. Albuterol. Follow up with primary care physician within 7 days. He reports he has  an appointment with Dr. Anaya Sommers on the 27th. INSTRUCTIONS: The patient has been instructed to follow up with his  primary care physician regarding his petechiae and epistaxis resulting  in significant amount of blood loss. Followup with the gastroenterology in the near future. Time taken for discharge greater than 30 minutes. I have discussed  the plan of care with the patient and he has expressed that he  understood the treatment plan.         MD DANIE Adams / Hans Roldan  D:  07/22/2017   15:04  T:  07/24/2017   07:23  Job #:  [id number]

## 2017-07-25 LAB
O+P SPEC MICRO: NORMAL
O+P STL CONC: NORMAL
SPECIMEN SOURCE: NORMAL

## 2017-07-26 ENCOUNTER — OFFICE VISIT (OUTPATIENT)
Dept: FAMILY MEDICINE CLINIC | Age: 32
End: 2017-07-26

## 2017-07-26 VITALS
BODY MASS INDEX: 20.72 KG/M2 | OXYGEN SATURATION: 99 % | HEART RATE: 97 BPM | RESPIRATION RATE: 18 BRPM | SYSTOLIC BLOOD PRESSURE: 150 MMHG | DIASTOLIC BLOOD PRESSURE: 98 MMHG | HEIGHT: 72 IN | WEIGHT: 153 LBS | TEMPERATURE: 97 F

## 2017-07-26 DIAGNOSIS — R10.9 ABDOMINAL PAIN, UNSPECIFIED LOCATION: Primary | ICD-10-CM

## 2017-07-26 DIAGNOSIS — M25.552 LEFT HIP PAIN: ICD-10-CM

## 2017-07-26 DIAGNOSIS — R07.9 CHEST PAIN, UNSPECIFIED TYPE: ICD-10-CM

## 2017-07-26 DIAGNOSIS — R11.2 NON-INTRACTABLE VOMITING WITH NAUSEA, UNSPECIFIED VOMITING TYPE: ICD-10-CM

## 2017-07-26 DIAGNOSIS — M54.2 NECK PAIN ON LEFT SIDE: ICD-10-CM

## 2017-07-26 DIAGNOSIS — M79.602 LEFT ARM PAIN: ICD-10-CM

## 2017-07-26 RX ORDER — PREGABALIN 75 MG/1
75 CAPSULE ORAL
COMMUNITY
End: 2021-11-01 | Stop reason: ALTCHOICE

## 2017-07-26 RX ORDER — CYCLOBENZAPRINE HCL 10 MG
10 TABLET ORAL
COMMUNITY
End: 2017-07-26 | Stop reason: SDUPTHER

## 2017-07-26 RX ORDER — ONDANSETRON 4 MG/1
4 TABLET, ORALLY DISINTEGRATING ORAL
COMMUNITY
Start: 2017-07-13 | End: 2022-08-30

## 2017-07-26 RX ORDER — ONDANSETRON 4 MG/1
TABLET, FILM COATED ORAL
Refills: 0 | COMMUNITY
Start: 2017-07-23 | End: 2017-07-26 | Stop reason: SDUPTHER

## 2017-07-26 NOTE — PROGRESS NOTES
HISTORY OF PRESENT ILLNESS  Symone Hoffmann is a 33 y.o. male. Chief Complaint   Patient presents with   [facility] Follow Up     Admission to [facility] in [place] on 7-9-17 abd pain. adm at [facility] 7/19/17 - 7/22/17 for abd pain. Pain level today is a 8/10       HPI  Pt here for f/u of multiple hospital admissions. He was visiting family in [facility] in June and early July. He was admitted there for LOC and emesis from 6/12-18/17. That stay revealed thickening of his ascending colon that was still present at his most recent admission. His emesis was controlled and he was released. His sx were still present but worsened in early July, so he was readmitted 7/9-13/17 for intractable nause and vomiting. Pt returned to [place] and one day later, sought care at [facility] ER. He was admitted at [facility] from 7/19-22/17. GI did evaluate pt in the hospital and plan for outpt f/u. He was on protonix bid but reglan was added. He is taking both meds regularly. He has had improvement in nausea without needing to take the phenergan. He has one dose of zofran. He does still have abd pain and hip pain. He has not been able to get in touch with his gi doctor for a f/u appt. Pt reports his hip has been bruised \"for more than a few months. \"   He has not seen ortho about his hip pain. During exam, pt recalls that he has L chest pain that is sharp and in the center of his chest.  His L shoulder and arm hurt as well, all the way to the tips of his fingers. The pain is sharp and stabbing and radiates down his arm as his activity level increases. He has dropped sodas because he loses  strength. 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 He was initially told he needed a stent in his neck but then the decision was made to try medical management. Review of Systems   Constitutional: Negative. HENT: Negative. Respiratory: Negative.     Cardiovascular: Positive for chest pain. Gastrointestinal: Positive for abdominal pain. Negative for nausea and vomiting. Musculoskeletal: Positive for joint pain and neck pain. Neurological: Positive for focal weakness. All other systems reviewed and are negative. Physical Exam  Nursing note and vitals reviewed. Constitutional: He is oriented to person, place, and time. He appears well-developed and well-nourished. HENT:   Head: Normocephalic and atraumatic. Right Ear: External ear normal.   Left Ear: External ear normal.   Nose: Nose normal.   Eyes: Conjunctivae and EOM are normal.   Neck: Normal range of motion. Neck supple. No JVD present. Carotid bruit is not present. No thyromegaly present. Cardiovascular: Normal rate, regular rhythm, normal heart sounds and intact distal pulses. Exam reveals no gallop and no friction rub. No murmur heard. Pulmonary/Chest: Effort normal and breath sounds normal. He has no wheezes. He has no rhonchi. He has no rales. Abdominal: Soft. Musculoskeletal: Normal range of motion. Neurological: He is alert and oriented to person, place, and time. Coordination normal.   Skin: Skin is warm and dry. Psychiatric: He has a normal mood and affect. His behavior is normal. Judgment and thought content normal.       ASSESSMENT and PLAN  Diagnoses and all orders for this visit:    1. Abdominal pain, unspecified location  2. Non-intractable vomiting with nausea, unspecified vomiting type  Improved. Pt to f/u with gi.      3. Left hip pain  -     REFERRAL TO ORTHOPEDICS    4. Neck pain on left side  -     REFERRAL TO PHYSICIAL MEDICINE REHAB    5. Left arm pain  -     REFERRAL TO PHYSICIAL MEDICINE REHAB    6. Chest pain, unspecified type  -     REFERRAL TO CARDIOLOGY  Suggested pt seek care at the ER. Pt declines. Pt will go to outpt appt with cards for eval.  Discussed possible risk of heart attack. Pt to seek emergency care as needed for chest pain.      Follow-up Disposition: prn

## 2017-07-26 NOTE — PROGRESS NOTES
Wendy Paula 33 y.o. male   Chief Complaint   Patient presents with   [facility] Follow Up     Admission to [facility] in [place] on 7-9-17 abd pain. Pain level today is a 8/10         1. Have you been to the ER, urgent care clinic since your last visit? Hospitalized since your last visit? Yes When: 7-9-17 Where: [facility] Reason for visit: abd pain    2. Have you seen or consulted any other health care providers outside of the [address] [place] [address] since your last visit? Include any pap smears or colon screening.  Yes When: 7-9-17 Where: [facility] Reason for visit: colonoscopy and EGD

## 2017-08-25 ENCOUNTER — TELEPHONE (OUTPATIENT)
Dept: CARDIOLOGY CLINIC | Age: 32
End: 2017-08-25
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
## 2021-11-01 ENCOUNTER — OFFICE VISIT (OUTPATIENT)
Dept: FAMILY MEDICINE CLINIC | Age: 36
End: 2021-11-01
Payer: COMMERCIAL

## 2021-11-01 VITALS
TEMPERATURE: 98.6 F | WEIGHT: 168 LBS | DIASTOLIC BLOOD PRESSURE: 125 MMHG | HEART RATE: 104 BPM | RESPIRATION RATE: 17 BRPM | OXYGEN SATURATION: 99 % | BODY MASS INDEX: 22.75 KG/M2 | HEIGHT: 72 IN | SYSTOLIC BLOOD PRESSURE: 181 MMHG

## 2021-11-01 DIAGNOSIS — Z13.220 SCREENING FOR LIPID DISORDERS: ICD-10-CM

## 2021-11-01 DIAGNOSIS — F90.2 ATTENTION DEFICIT HYPERACTIVITY DISORDER (ADHD), COMBINED TYPE: ICD-10-CM

## 2021-11-01 DIAGNOSIS — Z76.89 ENCOUNTER TO ESTABLISH CARE: ICD-10-CM

## 2021-11-01 DIAGNOSIS — G89.29 CHRONIC LOW BACK PAIN, UNSPECIFIED BACK PAIN LATERALITY, UNSPECIFIED WHETHER SCIATICA PRESENT: Primary | ICD-10-CM

## 2021-11-01 DIAGNOSIS — M54.50 CHRONIC LOW BACK PAIN, UNSPECIFIED BACK PAIN LATERALITY, UNSPECIFIED WHETHER SCIATICA PRESENT: Primary | ICD-10-CM

## 2021-11-01 DIAGNOSIS — K22.6 MALLORY-WEISS TEAR: ICD-10-CM

## 2021-11-01 DIAGNOSIS — I10 ESSENTIAL HYPERTENSION: ICD-10-CM

## 2021-11-01 DIAGNOSIS — Z11.59 NEED FOR HEPATITIS C SCREENING TEST: ICD-10-CM

## 2021-11-01 PROCEDURE — 99204 OFFICE O/P NEW MOD 45 MIN: CPT | Performed by: NURSE PRACTITIONER

## 2021-11-01 RX ORDER — METOPROLOL SUCCINATE 25 MG/1
25 TABLET, EXTENDED RELEASE ORAL DAILY
Qty: 90 TABLET | Refills: 0 | Status: SHIPPED | OUTPATIENT
Start: 2021-11-01 | End: 2022-08-30

## 2021-11-01 RX ORDER — PREGABALIN 100 MG/1
CAPSULE ORAL 2 TIMES DAILY
COMMUNITY

## 2021-11-01 RX ORDER — CYCLOBENZAPRINE HCL 10 MG
10 TABLET ORAL
Qty: 90 TABLET | Refills: 0 | Status: SHIPPED | OUTPATIENT
Start: 2021-11-01 | End: 2022-02-08

## 2021-11-01 NOTE — PATIENT INSTRUCTIONS
DASH Diet: Care Instructions  Your Care Instructions     The DASH diet is an eating plan that can help lower your blood pressure. DASH stands for Dietary Approaches to Stop Hypertension. Hypertension is high blood pressure. The DASH diet focuses on eating foods that are high in calcium, potassium, and magnesium. These nutrients can lower blood pressure. The foods that are highest in these nutrients are fruits, vegetables, low-fat dairy products, nuts, seeds, and legumes. But taking calcium, potassium, and magnesium supplements instead of eating foods that are high in those nutrients does not have the same effect. The DASH diet also includes whole grains, fish, and poultry. The DASH diet is one of several lifestyle changes your doctor may recommend to lower your high blood pressure. Your doctor may also want you to decrease the amount of sodium in your diet. Lowering sodium while following the DASH diet can lower blood pressure even further than just the DASH diet alone. Follow-up care is a key part of your treatment and safety. Be sure to make and go to all appointments, and call your doctor if you are having problems. It's also a good idea to know your test results and keep a list of the medicines you take. How can you care for yourself at home? Following the DASH diet  · Eat 4 to 5 servings of fruit each day. A serving is 1 medium-sized piece of fruit, ½ cup chopped or canned fruit, 1/4 cup dried fruit, or 4 ounces (½ cup) of fruit juice. Choose fruit more often than fruit juice. · Eat 4 to 5 servings of vegetables each day. A serving is 1 cup of lettuce or raw leafy vegetables, ½ cup of chopped or cooked vegetables, or 4 ounces (½ cup) of vegetable juice. Choose vegetables more often than vegetable juice. · Get 2 to 3 servings of low-fat and fat-free dairy each day. A serving is 8 ounces of milk, 1 cup of yogurt, or 1 ½ ounces of cheese. · Eat 6 to 8 servings of grains each day.  A serving is 1 slice of bread, 1 ounce of dry cereal, or ½ cup of cooked rice, pasta, or cooked cereal. Try to choose whole-grain products as much as possible. · Limit lean meat, poultry, and fish to 2 servings each day. A serving is 3 ounces, about the size of a deck of cards. · Eat 4 to 5 servings of nuts, seeds, and legumes (cooked dried beans, lentils, and split peas) each week. A serving is 1/3 cup of nuts, 2 tablespoons of seeds, or ½ cup of cooked beans or peas. · Limit fats and oils to 2 to 3 servings each day. A serving is 1 teaspoon of vegetable oil or 2 tablespoons of salad dressing. · Limit sweets and added sugars to 5 servings or less a week. A serving is 1 tablespoon jelly or jam, ½ cup sorbet, or 1 cup of lemonade. · Eat less than 2,300 milligrams (mg) of sodium a day. If you limit your sodium to 1,500 mg a day, you can lower your blood pressure even more. · Be aware that all of these are the suggested number of servings for people who eat 1,800 to 2,000 calories a day. Your recommended number of servings may be different if you need more or fewer calories. Tips for success  · Start small. Do not try to make dramatic changes to your diet all at once. You might feel that you are missing out on your favorite foods and then be more likely to not follow the plan. Make small changes, and stick with them. Once those changes become habit, add a few more changes. · Try some of the following:  ? Make it a goal to eat a fruit or vegetable at every meal and at snacks. This will make it easy to get the recommended amount of fruits and vegetables each day. ? Try yogurt topped with fruit and nuts for a snack or healthy dessert. ? Add lettuce, tomato, cucumber, and onion to sandwiches. ? Combine a ready-made pizza crust with low-fat mozzarella cheese and lots of vegetable toppings. Try using tomatoes, squash, spinach, broccoli, carrots, cauliflower, and onions. ?  Have a variety of cut-up vegetables with a low-fat dip as an appetizer instead of chips and dip. ? Sprinkle sunflower seeds or chopped almonds over salads. Or try adding chopped walnuts or almonds to cooked vegetables. ? Try some vegetarian meals using beans and peas. Add garbanzo or kidney beans to salads. Make burritos and tacos with mashed hameed beans or black beans. Where can you learn more? Go to http://www.johansne.com/  Enter H967 in the search box to learn more about \"DASH Diet: Care Instructions. \"  Current as of: April 29, 2021               Content Version: 13.0  © 7270-6557 [employer]. Care instructions adapted under license by [employer] (which disclaims liability or warranty for this information). If you have questions about a medical condition or this instruction, always ask your healthcare professional. [address] any warranty or liability for your use of this information.

## 2021-11-01 NOTE — ASSESSMENT & PLAN NOTE
BP elevated, goal <130/80  Restart low-dose metoprolol, titrate until BP goal achieved and if HR permits  Follow-up in 6 weeks

## 2021-11-01 NOTE — ASSESSMENT & PLAN NOTE
Advised that all controlled substances will need to come from pain management, referral generated  Given Flexeril refills, understands that this will also eventually be need to be from pain management  Consult spine specialist

## 2021-11-01 NOTE — PROGRESS NOTES
Estela Stephen presents today for   Chief Complaint   Patient presents with   [facility] Establish Care     Referrals needed for medications GI Pain management, Surgeons for Neck     Medication Evaluation    Behavioral Problem       Is someone accompanying this pt? No     Is the patient using any DME equipment during OV? no    Depression Screening:  3 most recent PHQ Screens 11/1/2021   PHQ Not Done -   Little interest or pleasure in doing things Not at all   Feeling down, depressed, irritable, or hopeless Not at all   Total Score PHQ 2 0       Learning Assessment:  Learning Assessment 11/1/2021   PRIMARY LEARNER Patient   HIGHEST LEVEL OF EDUCATION - PRIMARY LEARNER  GRADUATED HIGH SCHOOL OR GED   BARRIERS PRIMARY LEARNER NONE   CO-LEARNER CAREGIVER No   PRIMARY LANGUAGE ENGLISH   LEARNER PREFERENCE PRIMARY DEMONSTRATION     -     -   ANSWERED BY patient    RELATIONSHIP SELF       Fall Risk  No flowsheet data found. ADL  No flowsheet data found. Travel Screening:    Travel Screening     Question   Response    In the last month, have you been in contact with someone who was confirmed or suspected to have Coronavirus / COVID-19? No / Unsure    Have you had a COVID-19 viral test in the last 14 days? No    Do you have any of the following new or worsening symptoms? Have you traveled internationally or domestically in the last month? No      Travel History   Travel since 10/01/21     No documented travel since 10/01/21          Health Maintenance reviewed and discussed and ordered per Provider. Health Maintenance Due   Topic Date Due    Hepatitis C Screening  Never done   . Coordination of Care:  1. Have you been to the ER, urgent care clinic since your last visit? Hospitalized since your last visit? no    2. Have you seen or consulted any other health care providers outside of the [address] [place] [address] since your last visit? Include any pap smears or colon screening.  No

## 2021-12-10 PROBLEM — Z00.00 ANNUAL PHYSICAL EXAM: Status: ACTIVE | Noted: 2021-12-10

## 2022-01-21 ENCOUNTER — VIRTUAL VISIT (OUTPATIENT)
Dept: FAMILY MEDICINE CLINIC | Age: 37
End: 2022-01-21

## 2022-01-21 ENCOUNTER — DOCUMENTATION ONLY (OUTPATIENT)
Dept: FAMILY MEDICINE CLINIC | Age: 37
End: 2022-01-21

## 2022-01-21 DIAGNOSIS — Z09 HOSPITAL DISCHARGE FOLLOW-UP: Primary | ICD-10-CM

## 2022-01-21 DIAGNOSIS — G89.4 CHRONIC PAIN SYNDROME: ICD-10-CM

## 2022-01-21 DIAGNOSIS — K52.9 COLITIS: ICD-10-CM

## 2022-01-21 PROCEDURE — 1111F DSCHRG MED/CURRENT MED MERGE: CPT | Performed by: NURSE PRACTITIONER

## 2022-01-21 PROCEDURE — 99443 PR PHYS/QHP TELEPHONE EVALUATION 21-30 MIN: CPT | Performed by: NURSE PRACTITIONER

## 2022-01-21 RX ORDER — METRONIDAZOLE 500 MG/1
TABLET ORAL
Qty: 9 TABLET | Refills: 0 | Status: SHIPPED | OUTPATIENT
Start: 2022-01-21 | End: 2022-02-08 | Stop reason: ALTCHOICE

## 2022-01-21 RX ORDER — CIPROFLOXACIN 500 MG/1
TABLET ORAL
COMMUNITY
Start: 2022-01-16 | End: 2022-01-21 | Stop reason: SDUPTHER

## 2022-01-21 RX ORDER — CIPROFLOXACIN 500 MG/1
TABLET ORAL
Qty: 6 TABLET | Refills: 0 | Status: SHIPPED | OUTPATIENT
Start: 2022-01-21 | End: 2022-02-08 | Stop reason: ALTCHOICE

## 2022-01-21 RX ORDER — METRONIDAZOLE 500 MG/1
TABLET ORAL
COMMUNITY
Start: 2022-01-16 | End: 2022-01-21 | Stop reason: SDUPTHER

## 2022-01-21 NOTE — PROGRESS NOTES
Mr. Kira Joshi was seen as a new patient on 11/01/2021 with c/o chronic low back pain and chronic pain syndrome, on long-term narcotics. He was being managed by pain management back in [facility] and had relocated to [place]. During that initial visit, he was advised that his pain medication will need to be managed by pain management as well as ortho, which he was given referrals for. He was given a limited supply of muscle relaxants in the interim as per his request.  During his subsequent virtual appointment for hospital follow-up, the patient became agitated when told that his request for narcotics refill is denied as per our last conversation during his initial office visit. He stated that he was given a new prescription by the hospital for his colitis and that he was requesting the narcotics for his acute pain and not his chronic condition. After it was reiterated that his narcotics would not be refilled, he became agitated, threatened to call his  and hung up the phone. Due to the patient's unruly behavior regarding his plan of care, we will dismiss him from the practice.

## 2022-01-21 NOTE — PROGRESS NOTES
Transitional Care Management Progress Note    Patient: Dangelo Martinez  : 1985  PCP: Ji Kemp NP    Date of admission: 2022  Date of discharge: 2022    Patient was contacted by Transitional Care Management services within two days after his discharge: No. This encounter and supporting documentation was reviewed if available. Medication reconciliation was performed today (2022). Assessment/Plan:     1. Hospital discharge follow-up  -     [place] DISCHARGE MEDS RECONCILED W/ CURRENT OUTPATIENT MED LIST  2. Colitis  Assessment & Plan:  Offered to extend antibiotics for three more days until seen by GI  He declined steroids for pain management, requesting narcotic refill that was given in the ED  Advised patient that narcotics will not be refilled today, and he became agitated, threatened to call his  and hung up the phone  Orders:  -     metroNIDAZOLE (FLAGYL) 500 mg tablet; take 1 tablet by mouth every 8 hours for 3 days DO NOT CONSUME ALCOHOL UNTIL 2 DAYS AFTER LAST DOSE, Normal, Disp-9 Tablet, R-0  -     ciprofloxacin HCl (CIPRO) 500 mg tablet; take 1 tablet by mouth every 12 hours for 3 days, Normal, Disp-6 Tablet, R-0  3. Chronic pain syndrome  Assessment & Plan:  Explained to patient in detail that narcotics is not a refillable medication  Patient is aware from previous office visit that we will not be managing his chronic pain  He was referred to pain management as well as ortho back in November    Follow-up and Dispositions    · Return if symptoms worsen or fail to improve. Subjective:   Dangelo Martinez is a 29 y.o. male presenting today for follow-up after being discharged from [facility]. The discharge summary was reviewed or requested. The main problem requiring admission was colitis.    Complications during admission: see dx list      Hospital Course Per Discharge Summary:  Dangelo Martinez is a 29 y.o. male with PMH of IBS, DDD, & pseudoaneurysm who presented to the ED with abd pain, nausea, and vomiting. Initial workup significant for colitis, he was referred to IM for admission, further evaluation, and continued medical management. Upon assessment Mr. Paula reports acute onset of abd pain, nausea, vomiting, & diarrhea around noon yesterday afternoon. He denies any fever, chills, cough, SOB, CP, urinary symptoms, or sick contacts. He has not been able to get set up with pain management so he hasn't been able to continue his pain regimen. Patient managed conservatively in the hospital with fluid hydration and antibiotic for colitis. Symptomatic for the nausea. Patient nausea and vomiting resolved. Patient tolerating oral diet  Diarrhea improving with antibiotics. Patient pain control better. Patient afebrile hemodynamically okay ready to discharge home with instruction follow-up PCP and GI in the office. Patient reported that he has diagnosed ulcerative colitis 6 years ago but never been on any treatment. Educated the patient to follow-up with GI in the office and recommend to start on therapy for ulcerative colitis. Patient afebrile hemodynamically okay ready discharge home      Interval history/Current status: Patient presents today for hospital follow-up. He continues to have diarrhea, abdominal pain, cramping. He has an appointment with Dr. Ethan Espinoza on Monday, 01/24/2022. He was given Ciprofloxacin and Flagyl for 3 days at discharge. He was told to stop taking Prednisone in the hospital as well as the diazepam.  He states that her had seen several different GI specialists in the past who was not able to pinpoint a definitive diagnosis. Admitting symptoms have: not changed    Medications marked \"taking\" at this time:  Home Medications    Medication Sig Start Date End Date Taking?  Authorizing Provider   metroNIDAZOLE (FLAGYL) 500 mg tablet take 1 tablet by mouth every 8 hours for 3 days DO NOT CONSUME ALCOHOL UNTIL 2 DAYS AFTER LAST DOSE 1/21/22  Yes Igor Saldivar NP   ciprofloxacin HCl (CIPRO) 500 mg tablet take 1 tablet by mouth every 12 hours for 3 days 1/21/22  Yes Igor Saldivar NP   pregabalin (LYRICA) 100 mg capsule Take  by mouth two (2) times a day. Yes Provider, Historical   metoprolol succinate (TOPROL-XL) 25 mg XL tablet Take 1 Tablet by mouth daily. Indications: high blood pressure 11/1/21  Yes Nithya Ceja NP   cyclobenzaprine (FLEXERIL) 10 mg tablet Take 1 Tablet by mouth three (3) times daily as needed for Muscle Spasm(s). 11/1/21  Yes Igor Saldivar NP   ondansetron (ZOFRAN ODT) 4 mg disintegrating tablet Take 4 mg by mouth. 7/13/17  Yes Provider, Historical   albuterol (PROVENTIL HFA, VENTOLIN HFA, PROAIR HFA) 90 mcg/actuation inhaler Take 2 Puffs by inhalation every four (4) hours as needed for Wheezing. 1/5/17  Yes Adriana Woody MD   oxyCODONE ER (OXYCONTIN) 20 mg ER tablet  3/6/16  Yes Provider, Historical   cetirizine (ZYRTEC) 10 mg tablet Take  by mouth daily.    Yes Provider, Historical   metroNIDAZOLE (FLAGYL) 500 mg tablet take 1 tablet by mouth every 8 hours for 3 days DO NOT CONSUME ALCOHOL UNTIL 2 DAYS AFTER LAST DOSE 1/16/22 1/21/22  Provider, Historical   ciprofloxacin HCl (CIPRO) 500 mg tablet take 1 tablet by mouth every 12 hours for 3 days 1/16/22 1/21/22  Provider, Historical          Patient Active Problem List   Diagnosis Code    LBP (low back pain) M54.50    Encounter for long-term (current) use of other medications Z79.899    Radiculitis M54.10    Intervertebral disc extrusion [id number]    ADHD (attention deficit hyperactivity disorder) F90.9    Marijuana use F12.90    Abnormal bruising R23.8    Colitis K52.9    Kelsea-Schulte tear K22.6    Chronic low back pain M54.50, G89.29    Essential hypertension I10    Annual physical exam Z00.00    Chronic pain syndrome G89.4     Current Outpatient Medications   Medication Sig Dispense Refill    metroNIDAZOLE (FLAGYL) 500 mg tablet take 1 tablet by mouth every 8 hours for 3 days DO NOT CONSUME ALCOHOL UNTIL 2 DAYS AFTER LAST DOSE 9 Tablet 0    ciprofloxacin HCl (CIPRO) 500 mg tablet take 1 tablet by mouth every 12 hours for 3 days 6 Tablet 0    pregabalin (LYRICA) 100 mg capsule Take  by mouth two (2) times a day.  metoprolol succinate (TOPROL-XL) 25 mg XL tablet Take 1 Tablet by mouth daily. Indications: high blood pressure 90 Tablet 0    cyclobenzaprine (FLEXERIL) 10 mg tablet Take 1 Tablet by mouth three (3) times daily as needed for Muscle Spasm(s). 90 Tablet 0    ondansetron (ZOFRAN ODT) 4 mg disintegrating tablet Take 4 mg by mouth.  albuterol (PROVENTIL HFA, VENTOLIN HFA, PROAIR HFA) 90 mcg/actuation inhaler Take 2 Puffs by inhalation every four (4) hours as needed for Wheezing. 1 Inhaler 0    oxyCODONE ER (OXYCONTIN) 20 mg ER tablet   0    cetirizine (ZYRTEC) 10 mg tablet Take  by mouth daily.        Allergies   Allergen Reactions    Amoxicillin Other (comments)    Demerol [Meperidine] Hives and Swelling    Hydrocodone-Acetaminophen Nausea Only    Ketorolac Nausea Only    Pcn [Penicillins] Hives    Sulfa (Sulfonamide Antibiotics) Hives    Tramadol Other (comments)      Past Medical History:   Diagnosis Date    Acid indigestion     ADHD (attention deficit hyperactivity disorder)     Asthma     Back pain     Heartburn     Herpes     HTN (hypertension)     IBS (irritable bowel syndrome)     Joint pain     Muscle pain     Muscle weakness     Trouble in sleeping       Social History     Socioeconomic History    Marital status: SINGLE     Spouse name: Not on file    Number of children: Not on file    Years of education: Not on file    Highest education level: Not on file   Occupational History    Not on file   Tobacco Use    Smoking status: Current Every Day Smoker     Packs/day: 0.50     Years: 13.00     Pack years: 6.50     Types: Cigarettes    Smokeless tobacco: Never Used   Vaping Use    Vaping Use: Never used   Substance and Sexual Activity    Alcohol use: Yes     Alcohol/week: 1.7 standard drinks     Types: 2 Standard drinks or equivalent per week     Comment: rare    Drug use: Yes     Types: Marijuana, Prescription, OTC    Sexual activity: Never   Other Topics Concern    Not on file   Social History Narrative    Not on file     Social Determinants of Health     Financial Resource Strain:     Difficulty of Paying Living Expenses: Not on file   Food Insecurity:     Worried About Running Out of Food in the Last Year: Not on file    Norma of Food in the Last Year: Not on file   Transportation Needs:     Lack of Transportation (Medical): Not on file    Lack of Transportation (Non-Medical):  Not on file   Physical Activity:     Days of Exercise per Week: Not on file    Minutes of Exercise per Session: Not on file   Stress:     Feeling of Stress : Not on file   Social Connections:     Frequency of Communication with Friends and Family: Not on file    Frequency of Social Gatherings with Friends and Family: Not on file    Attends Gnosticist Services: Not on file    Active Member of Clubs or Organizations: Not on file    Attends Club or Organization Meetings: Not on file    Marital Status: Not on file   Intimate Partner Violence:     Fear of Current or Ex-Partner: Not on file    Emotionally Abused: Not on file    Physically Abused: Not on file    Sexually Abused: Not on file   Housing Stability:     Unable to Pay for Housing in the Last Year: Not on file    Number of [place] in the Last Year: Not on file    Unstable Housing in the Last Year: Not on file      Past Surgical History:   Procedure Laterality Date    HX BACK SURGERY  2013    HX ORTHOPAEDIC      RT HAND      Family History   Problem Relation Age of Onset    Cancer Other         GRANDMOTHER, AND AUNT    Diabetes Other         GREAT GRANDFATHER    Heart Disease Other         AUNT    Hypertension Other GRANDMOTHER, GRANDFATHER, MOTHER    Stroke Other     Immunodeficiency Other         ALL FAMILY MEMBERS        Review of Systems   Constitutional: Negative for chills, fever and malaise/fatigue. Respiratory: Negative for shortness of breath. Cardiovascular: Negative for chest pain. Gastrointestinal: Positive for abdominal pain. Objective:      Physical Exam   Constitutional: Alert and oriented, in no distress  HENT:   Ears:  Hearing grossly intact. Pulmonary/Chest: Does not sound dyspneic, no audible wheezes   Neurological:  Intact recent memory, answering questions appropriately. Psychiatric: Agitated    We discussed the expected course, resolution and complications of the diagnosis(es) in detail. Medication risks, benefits, costs, interactions, and alternatives were discussed as indicated. I advised him to contact the office if his condition worsens, changes or fails to improve as anticipated. He expressed understanding with the diagnosis(es) and plan. Yesica Rosas, who was evaluated through a synchronous (real-time) audio only encounter, and/or his healthcare decision maker, is aware that it is a billable service, which includes applicable co-pays, with coverage as determined by his insurance carrier. He provided verbal consent to proceed and patient identification was verified. This visit was conducted pursuant to the emergency declaration under the [facility], [address] [place] [address] [facility] authority and the [employer] General Act. A caregiver was present when appropriate. Ability to conduct physical exam was limited. The patient was located at home in a state where the provider was licensed to provide care.        Total time spent:  21-30 minutes  Berna Funez NP

## 2022-01-21 NOTE — PROGRESS NOTES
Angela Sanders presents today for   Chief Complaint   Patient presents with   [facility] Follow Up     Patient states that he is still having issues with diarrhea and abdominal pain. Patient also feel nauseous at times. Virtual/telephone visit    Depression Screening:  3 most recent PHQ Screens 1/21/2022   PHQ Not Done -   Little interest or pleasure in doing things Not at all   Feeling down, depressed, irritable, or hopeless Not at all   Total Score PHQ 2 0       Learning Assessment:  Learning Assessment 1/21/2022   PRIMARY LEARNER Patient   HIGHEST LEVEL OF EDUCATION - PRIMARY LEARNER  GRADUATED HIGH SCHOOL OR GED   BARRIERS PRIMARY LEARNER NONE   CO-LEARNER CAREGIVER No   PRIMARY LANGUAGE ENGLISH   LEARNER PREFERENCE PRIMARY DEMONSTRATION     -     -   ANSWERED BY patient    RELATIONSHIP SELF       Fall Risk  No flowsheet data found. ADL  No flowsheet data found. Travel Screening:    Travel Screening     Question   Response    In the last month, have you been in contact with someone who was confirmed or suspected to have Coronavirus / COVID-19? No / Unsure    Have you had a COVID-19 viral test in the last 14 days? No    Do you have any of the following new or worsening symptoms? Have you traveled internationally or domestically in the last month? No      Travel History   Travel since 12/21/21    No documented travel since 12/21/21         Health Maintenance reviewed and discussed and ordered per Provider. Health Maintenance Due   Topic Date Due    Hepatitis C Screening  Never done    COVID-19 Vaccine (1) Never done   . Coordination of Care:    1. Have you been to the ER, urgent care clinic since your last visit? Hospitalized since your last visit? Yes [facility]     2. Have you seen or consulted any other health care providers outside of the [address] [place] [address] since your last visit? Include any pap smears or colon screening.  no

## 2022-01-21 NOTE — ASSESSMENT & PLAN NOTE
Offered to extend antibiotics for three more days until seen by GI  He declined steroids for pain management, requesting narcotic refill that was given in the ED  Advised patient that narcotics will not be refilled today, and he became agitated, threatened to call his  and hung up the phone

## 2022-01-21 NOTE — LETTER
1/25/2022    Alexandria Paula  1985  [address]      Dear Greer Brittle,    I regret to inform you that the provider(s) at [place] [address] will no longer be able to provide your medical care, effective from the date of this letter. I recommend that you immediately find a new provider. We will provide prescriptions for your current medications based on your past refill history to ensure you will have sufficient medications to last 30 days from the date of this letter. To obtain your medical records, please contact our Medical Records department. You may contact your insurance provider, managed care organization (if applicable) or local Medical Society to obtain a current listing of providers available to provide care. I urge you to see a new provider quickly so that there will be no interruption of your care. I wish you the best in your future medical care.       Sincerely,        TALIB Alvarez

## 2022-01-21 NOTE — ASSESSMENT & PLAN NOTE
Explained to patient in detail that narcotics is not a refillable medication  Patient is aware from previous office visit that we will not be managing his chronic pain  He was referred to pain management as well as ortho back in November

## 2022-02-08 ENCOUNTER — OFFICE VISIT (OUTPATIENT)
Dept: FAMILY MEDICINE CLINIC | Age: 37
End: 2022-02-08
Payer: COMMERCIAL

## 2022-02-08 VITALS
HEART RATE: 76 BPM | WEIGHT: 160 LBS | TEMPERATURE: 97.6 F | SYSTOLIC BLOOD PRESSURE: 138 MMHG | OXYGEN SATURATION: 100 % | DIASTOLIC BLOOD PRESSURE: 84 MMHG | RESPIRATION RATE: 10 BRPM | BODY MASS INDEX: 21.7 KG/M2

## 2022-02-08 DIAGNOSIS — G89.4 CHRONIC PAIN SYNDROME: Primary | ICD-10-CM

## 2022-02-08 DIAGNOSIS — G89.29 CHRONIC LOW BACK PAIN, UNSPECIFIED BACK PAIN LATERALITY, UNSPECIFIED WHETHER SCIATICA PRESENT: ICD-10-CM

## 2022-02-08 DIAGNOSIS — M54.50 CHRONIC LOW BACK PAIN, UNSPECIFIED BACK PAIN LATERALITY, UNSPECIFIED WHETHER SCIATICA PRESENT: ICD-10-CM

## 2022-02-08 DIAGNOSIS — K21.9 GASTROESOPHAGEAL REFLUX DISEASE WITHOUT ESOPHAGITIS: ICD-10-CM

## 2022-02-08 DIAGNOSIS — F90.0 ATTENTION DEFICIT HYPERACTIVITY DISORDER (ADHD), PREDOMINANTLY INATTENTIVE TYPE: ICD-10-CM

## 2022-02-08 PROCEDURE — 99214 OFFICE O/P EST MOD 30 MIN: CPT | Performed by: FAMILY MEDICINE

## 2022-02-08 RX ORDER — RANITIDINE 150 MG/1
150 TABLET, FILM COATED ORAL 2 TIMES DAILY
Qty: 60 TABLET | Refills: 1 | Status: SHIPPED | OUTPATIENT
Start: 2022-02-08

## 2022-02-08 RX ORDER — CYCLOBENZAPRINE HCL 10 MG
10 TABLET ORAL
Qty: 40 TABLET | Refills: 0 | Status: SHIPPED | OUTPATIENT
Start: 2022-02-08

## 2022-02-08 NOTE — PATIENT INSTRUCTIONS
Chronic Pain: Care Instructions  Your Care Instructions     Chronic pain is pain that lasts a long time (months or even years) and may or may not have a clear cause. It is different from acute pain, which usually does have a clear cause--like an injury or illness--and gets better over time. Chronic pain:  · Lasts over time but may vary from day to day. · Does not go away despite efforts to end it. · May disrupt your sleep and lead to fatigue. · May cause depression or anxiety. · May make your muscles tense, causing more pain. · Can disrupt your work, hobbies, home life, and relationships with friends and family. Chronic pain is a very real condition. It is not just in your head. Treatment can help and usually includes several methods used together, such as medicines, physical therapy, exercise, and other treatments. Learning how to relax and changing negative thought patterns can also help you cope. Chronic pain is complex. Taking an active role in your treatment will help you better manage your pain. Tell your doctor if you have trouble dealing with your pain. You may have to try several things before you find what works best for you. Follow-up care is a key part of your treatment and safety. Be sure to make and go to all appointments, and call your doctor if you are having problems. It's also a good idea to know your test results and keep a list of the medicines you take. How can you care for yourself at home? · Pace yourself. Break up large jobs into smaller tasks. Save harder tasks for days when you have less pain, or go back and forth between hard tasks and easier ones. Take rest breaks. · Relax, and reduce stress. Relaxation techniques such as deep breathing or meditation can help. · Keep moving. Gentle, daily exercise can help reduce pain over the long run. Try low- or no-impact exercises such as walking, swimming, and stationary biking. Do stretches to stay flexible.   · Try heat, cold packs, and massage. · Get enough sleep. Chronic pain can make you tired and drain your energy. Talk with your doctor if you have trouble sleeping because of pain. · Think positive. Your thoughts can affect your pain level. Do things that you enjoy to distract yourself when you have pain instead of focusing on the pain. See a movie, read a book, listen to music, or spend time with a friend. · If you think you are depressed, talk to your doctor about treatment. · Keep a daily pain diary. Record how your moods, thoughts, sleep patterns, activities, and medicine affect your pain. You may find that your pain is worse during or after certain activities or when you are feeling a certain emotion. Having a record of your pain can help you and your doctor find the best ways to treat your pain. · Take pain medicines exactly as directed. ? If the doctor gave you a prescription medicine for pain, take it as prescribed. ? If you are not taking a prescription pain medicine, ask your doctor if you can take an over-the-counter medicine. Reducing constipation caused by pain medicine  · Talk to your doctor about a laxative. If a laxative doesn't work, your doctor may suggest a prescription medicine. · Include fruits, vegetables, beans, and whole grains in your diet each day. These foods are high in fiber. · If your doctor recommends it, get more exercise. Walking is a good choice. Bit by bit, increase the amount you walk every day. Try for at least 30 minutes on most days of the week. · Schedule time each day for a bowel movement. A daily routine may help. Take your time and do not strain when having a bowel movement. When should you call for help? Call your doctor now or seek immediate medical care if:    · Your pain gets worse or is out of control.     · You feel down or blue, or you do not enjoy things like you once did. You may be depressed, which is common in people with chronic pain.  Depression can be treated.     · You have vomiting or cramps for more than 2 hours. Watch closely for changes in your health, and be sure to contact your doctor if:    · You cannot sleep because of pain.     · You are very worried or anxious about your pain.     · You have trouble taking your pain medicine.     · You have any concerns about your pain medicine.     · You have trouble with bowel movements, such as:  ? No bowel movement in 3 days. ? Blood in the anal area, in your stool, or on the toilet paper. ? Diarrhea for more than 24 hours. Where can you learn more? Go to http://www.gray.com/  Enter N004 in the search box to learn more about \"Chronic Pain: Care Instructions. \"  Current as of: April 8, 2021               Content Version: 13.0  © 9582-7433 [employer]. Care instructions adapted under license by [employer] (which disclaims liability or warranty for this information). If you have questions about a medical condition or this instruction, always ask your healthcare professional. [place] [address] any warranty or liability for your use of this information. Attention Deficit Hyperactivity Disorder (ADHD) in Adults: Care Instructions  Your Care Instructions     Attention deficit hyperactivity disorder, or ADHD, is a condition that makes it hard to pay attention. So you may have problems when you try to focus, get organized, and finish tasks. It might make you more active than other people. Or you might do things without thinking first.  ADHD is very common. It usually starts in early childhood. Many adults don't realize they have it until their children are diagnosed. Then they become aware of their own symptoms. Doctors don't know what causes ADHD. But it often runs in families. ADHD can be treated with medicines, behavior training, and counseling. Treatment can improve your life. Follow-up care is a key part of your treatment and safety.  Be sure to make and go to all appointments, and call your doctor if you are having problems. It's also a good idea to know your test results and keep a list of the medicines you take. How can you care for yourself at home? · Learn all you can about ADHD. This will help you and your family understand it better. · Take your medicines exactly as prescribed. Call your doctor if you think you are having a problem with your medicine. You will get more details on the specific medicines your doctor prescribes. · If you miss a dose of your medicine, do not take an extra dose. · If your doctor suggests counseling, find a counselor you like and trust. Talk openly and honestly. Be willing to make some changes. · Find a support group for adults with ADHD. Talking to others with the same problems can help you feel better. It can also give you ideas about how to best cope with the condition. · Get rid of distractions at your work space. Keep your desk clean. Try not to face a window or busy hallway. · Use files, planners, and other tools to keep you organized. · Limit use of alcohol, and do not use illegal drugs. People with ADHD tend to develop substance use disorder more easily than others. Tell your doctor if you need help to quit. Counseling, support groups, and sometimes medicines can help you stay free of alcohol or drugs. · Get at least 30 minutes of physical activity on most days of the week. Exercise has been shown to help people cope with ADHD. Walking is a good choice. You also may want to do other activities, such as running, swimming, cycling, or playing tennis or team sports. When should you call for help?   Watch closely for changes in your health, and be sure to contact your doctor if:    · You feel sad a lot or cry all the time.     · You have trouble sleeping, or you sleep too much.     · You find it hard to concentrate, make decisions, or remember things.     · You change how you normally eat.     · You feel guilty for no reason. Where can you learn more? Go to http://www.M2TECH.com/  Enter B196 in the search box to learn more about \"Attention Deficit Hyperactivity Disorder (ADHD) in Adults: Care Instructions. \"  Current as of: June 16, 2021               Content Version: 13.0  © 4819-1784 [employer]. Care instructions adapted under license by [employer] (which disclaims liability or warranty for this information). If you have questions about a medical condition or this instruction, always ask your healthcare professional. [place] [address] any warranty or liability for your use of this information.

## 2022-02-08 NOTE — PROGRESS NOTES
Jocy Hodgson is a 29 y.o. male  presents for establish care. He has chronic pain in neck and back. He wants controlled pain meds and ADHD meds. Allergies   Allergen Reactions    Amoxicillin Other (comments)    Demerol [Meperidine] Hives and Swelling    Hydrocodone-Acetaminophen Nausea Only    Ketorolac Nausea Only    Pcn [Penicillins] Hives    Sulfa (Sulfonamide Antibiotics) Hives    Tramadol Other (comments)     Outpatient Medications Marked as Taking for the 2/8/22 encounter (Office Visit) with Sagarrio Best MD   Medication Sig Dispense Refill    [DISCONTINUED] metroNIDAZOLE (FLAGYL) 500 mg tablet take 1 tablet by mouth every 8 hours for 3 days DO NOT CONSUME ALCOHOL UNTIL 2 DAYS AFTER LAST DOSE 9 Tablet 0    [DISCONTINUED] ciprofloxacin HCl (CIPRO) 500 mg tablet take 1 tablet by mouth every 12 hours for 3 days 6 Tablet 0    pregabalin (LYRICA) 100 mg capsule Take  by mouth two (2) times a day.  metoprolol succinate (TOPROL-XL) 25 mg XL tablet Take 1 Tablet by mouth daily. Indications: high blood pressure 90 Tablet 0    cyclobenzaprine (FLEXERIL) 10 mg tablet Take 1 Tablet by mouth three (3) times daily as needed for Muscle Spasm(s). 90 Tablet 0    ondansetron (ZOFRAN ODT) 4 mg disintegrating tablet Take 4 mg by mouth.  albuterol (PROVENTIL HFA, VENTOLIN HFA, PROAIR HFA) 90 mcg/actuation inhaler Take 2 Puffs by inhalation every four (4) hours as needed for Wheezing. 1 Inhaler 0    oxyCODONE ER (OXYCONTIN) 20 mg ER tablet   0    cetirizine (ZYRTEC) 10 mg tablet Take  by mouth daily.        Patient Active Problem List   Diagnosis Code    LBP (low back pain) M54.50    Encounter for long-term (current) use of other medications Z79.899    Radiculitis M54.10    Intervertebral disc extrusion [id number]    ADHD (attention deficit hyperactivity disorder) F90.9    Marijuana use F12.90    Abnormal bruising R23.8    Colitis K52.9    Kelsea-Schulte tear K22.6    Chronic low back pain M54.50, G89.29    Essential hypertension I10    Annual physical exam Z00.00    Chronic pain syndrome G89.4     Past Medical History:   Diagnosis Date    Acid indigestion     ADHD (attention deficit hyperactivity disorder)     Asthma     Back pain     Heartburn     Herpes     HTN (hypertension)     IBS (irritable bowel syndrome)     Joint pain     Muscle pain     Muscle weakness     Trouble in sleeping      Social History     Socioeconomic History    Marital status: SINGLE   Tobacco Use    Smoking status: Current Every Day Smoker     Packs/day: 0.50     Years: 13.00     Pack years: 6.50     Types: Cigarettes    Smokeless tobacco: Never Used   Vaping Use    Vaping Use: Never used   Substance and Sexual Activity    Alcohol use: Yes     Alcohol/week: 1.7 standard drinks     Types: 2 Standard drinks or equivalent per week     Comment: rare    Drug use: Yes     Types: Marijuana, Prescription, OTC    Sexual activity: Never     Family History   Problem Relation Age of Onset    Cancer Other         GRANDMOTHER, AND AUNT    Diabetes Other         GREAT GRANDFATHER    Heart Disease Other         AUNT    Hypertension Other         GRANDMOTHER, GRANDFATHER, MOTHER    Stroke Other     Immunodeficiency Other         ALL FAMILY MEMBERS        Review of Systems   Constitutional: Negative for chills, fever, malaise/fatigue and weight loss. Eyes: Negative for blurred vision. Respiratory: Negative for cough, shortness of breath and wheezing. Cardiovascular: Negative for chest pain. Gastrointestinal: Negative for nausea and vomiting. Musculoskeletal: Positive for back pain and neck pain. Negative for joint pain and myalgias. Skin: Negative for rash. Neurological: Negative for weakness. Psychiatric/Behavioral: Negative for depression, hallucinations, memory loss, substance abuse and suicidal ideas. The patient is nervous/anxious. The patient does not have insomnia.         Vitals: 02/08/22 0913   BP: 138/84   Pulse: 76   Resp: 10   Temp: 97.6 °F (36.4 °C)   SpO2: 100%   Weight: 160 lb (72.6 kg)   PainSc:   0 - No pain       Physical Exam  Vitals and nursing note reviewed. Constitutional:       Appearance: Normal appearance. He is normal weight. Neck:      Thyroid: No thyromegaly. Cardiovascular:      Rate and Rhythm: Normal rate and regular rhythm. Pulses: Normal pulses. Heart sounds: Normal heart sounds. Pulmonary:      Effort: Pulmonary effort is normal.      Breath sounds: Normal breath sounds. Musculoskeletal:         General: Normal range of motion. Cervical back: Normal range of motion and neck supple. Skin:     General: Skin is warm and dry. Neurological:      Mental Status: He is alert and oriented to person, place, and time. Psychiatric:         Mood and Affect: Mood normal.         Thought Content: Thought content normal.         Judgment: Judgment normal.         Assessment/Plan      ICD-10-CM ICD-9-CM    1. Chronic pain syndrome  G89.4 338.4 REFERRAL TO PAIN MANAGEMENT   2. Chronic low back pain, unspecified back pain laterality, unspecified whether sciatica present  M54.50 724.2 cyclobenzaprine (FLEXERIL) 10 mg tablet    G89.29 338.29    3. Gastroesophageal reflux disease without esophagitis  K21.9 530.81 raNITIdine (ZANTAC) 150 mg tablet   4. Attention deficit hyperactivity disorder (ADHD), predominantly inattentive type  F90.0 314.00 REFERRAL TO PSYCHIATRY     I have discussed the diagnosis with the patient and the intended plan of care as seen in the above orders. The patient has received an after-visit summary and questions were answered concerning future plans. I have discussed medication, side effects, and warnings with the patient in detail. The patient verbalized understanding and is in agreement with the plan of care. The patient will contact the office with any additional concerns.     Discussed at length that controlled meds will have to be prescribed by specialist.    lab results and schedule of future lab studies reviewed with patient    iNcole Philip MD

## 2022-02-08 NOTE — PROGRESS NOTES
Chief Complaint   Patient presents with    Neck Pain    Elbow Pain    GERD     Pt in office to establish care. He states he moved from [facility] late last year and has been 'having a hard time finding a doctor who can help him until he gets into al his specialists'. He was referred by his aunt who is a pt of Dr. Guerrero Rangel. He at one point was taking    Cyclobenzaprine 10 mg   Lyrica 100 mg  Oxycodone 10 mg TID  Omeprazole 40 mg, but would like to see about getting ranitidine or it's equivalent to take as needed.

## 2022-02-28 ENCOUNTER — TELEPHONE (OUTPATIENT)
Dept: FAMILY MEDICINE CLINIC | Age: 37
End: 2022-02-28

## 2022-02-28 NOTE — TELEPHONE ENCOUNTER
Pt called in regards to referrals put in on 2/8. He states that he will need 2 new referrals to different places due to insurance. The offices he has requested are listed below. (PSYCHIATRY)    MD-      Phone- [phone]    Address- [address]        (Pain Management)    MD- Lay Herrera    Phone- [phone]    Address- [address] [address]      Please review and advise.

## 2022-03-19 PROBLEM — G89.4 CHRONIC PAIN SYNDROME: Status: ACTIVE | Noted: 2022-01-21

## 2022-03-19 PROBLEM — Z00.00 ANNUAL PHYSICAL EXAM: Status: ACTIVE | Noted: 2021-12-10

## 2022-03-19 PROBLEM — M54.50 CHRONIC LOW BACK PAIN: Status: ACTIVE | Noted: 2021-11-01

## 2022-03-19 PROBLEM — K52.9 COLITIS: Status: ACTIVE | Noted: 2017-07-20

## 2022-03-19 PROBLEM — K22.6 MALLORY-WEISS TEAR: Status: ACTIVE | Noted: 2021-11-01

## 2022-03-19 PROBLEM — I10 ESSENTIAL HYPERTENSION: Status: ACTIVE | Noted: 2021-11-01

## 2022-03-19 PROBLEM — G89.29 CHRONIC LOW BACK PAIN: Status: ACTIVE | Noted: 2021-11-01

## 2022-03-20 PROBLEM — R23.3 ABNORMAL BRUISING: Status: ACTIVE | Noted: 2017-04-19

## 2022-04-11 ENCOUNTER — TELEPHONE (OUTPATIENT)
Dept: FAMILY MEDICINE CLINIC | Age: 37
End: 2022-04-11

## 2022-04-27 DIAGNOSIS — K21.9 GASTROESOPHAGEAL REFLUX DISEASE WITHOUT ESOPHAGITIS: Primary | ICD-10-CM

## 2022-04-27 RX ORDER — PANTOPRAZOLE SODIUM 40 MG/1
40 TABLET, DELAYED RELEASE ORAL DAILY
Qty: 30 TABLET | Refills: 3 | Status: SHIPPED | OUTPATIENT
Start: 2022-04-27 | End: 2022-08-15

## 2022-04-27 NOTE — TELEPHONE ENCOUNTER
----- Message from Adalberto Laws sent at 4/26/2022 10:36 AM EDT -----  Subject: Message to Provider    QUESTIONS  Information for Provider? PT requesting a refill for Protonix 40mg 1xday,   also requesting a referral for [facility] [place]. and for [facility] for Pain Medicine,   contact# [phone]  ---------------------------------------------------------------------------  --------------  CALL BACK INFO  What is the best way for the office to contact you? OK to leave message on   voicemail  Preferred Call Back Phone Number? [phone]  ---------------------------------------------------------------------------  --------------  SCRIPT ANSWERS  Relationship to Patient?  Self

## 2022-04-27 NOTE — TELEPHONE ENCOUNTER
Referral has been faxed to [facility] pain management. Referral request for protonix sent to dr Criss Lopez.

## 2022-04-28 ENCOUNTER — TELEPHONE (OUTPATIENT)
Dept: FAMILY MEDICINE CLINIC | Age: 37
End: 2022-04-28

## 2022-04-28 DIAGNOSIS — M25.50 ARTHRALGIA, UNSPECIFIED JOINT: Primary | ICD-10-CM

## 2022-04-28 RX ORDER — MELOXICAM 15 MG/1
15 TABLET ORAL DAILY
Qty: 30 TABLET | Refills: 0 | Status: SHIPPED | OUTPATIENT
Start: 2022-04-28 | End: 2022-04-28 | Stop reason: CLARIF

## 2022-04-28 NOTE — TELEPHONE ENCOUNTER
Fax received from [address] [place] [address] meds stating prior auth is required for the PANTOPRAZOLE SODIUM 40MG DR TABLETS. Submit a PA request  1. Go to key. [employer] and click \"Enter a Key\"  2. Patient last name: Jeni Cronin      : 1985      Key: [id number]  3.  Click \"start a PA\", complete the form, and \"send to plan\"

## 2022-04-28 NOTE — TELEPHONE ENCOUNTER
mobic cancelled on pt's chart. Sent in error. Spoke to jayden at the pharmacy. She removed med from pt's list. Pharmacist Read back order and stated understanding.

## 2022-04-28 NOTE — TELEPHONE ENCOUNTER
Spoke to patient. He asked that his pain management referral also be sent to [facility]. pain management.  Referral was printed and faxed

## 2022-08-12 DIAGNOSIS — K21.9 GASTROESOPHAGEAL REFLUX DISEASE WITHOUT ESOPHAGITIS: ICD-10-CM

## 2022-08-15 RX ORDER — PANTOPRAZOLE SODIUM 40 MG/1
TABLET, DELAYED RELEASE ORAL
Qty: 30 TABLET | Refills: 3 | Status: SHIPPED | OUTPATIENT
Start: 2022-08-15

## 2022-08-25 ENCOUNTER — TELEPHONE (OUTPATIENT)
Dept: FAMILY MEDICINE CLINIC | Age: 37
End: 2022-08-25

## 2022-08-25 NOTE — TELEPHONE ENCOUNTER
[facility] Nurse [facility]  Subject: Refill Request     QUESTIONS   Name of Medication? pantoprazole (PROTONIX) 40 mg tablet   Patient-reported dosage and instructions? 40mg take 1 a day   How many days do you have left? 6   Preferred Pharmacy? [id number]   Pharmacy phone number (if available)? [phone]   ---------------------------------------------------------------------------   --------------   CALL BACK INFO   What is the best way for the office to contact you? OK to leave message on   voicemail   Preferred Call Back Phone Number? [phone]   ---------------------------------------------------------------------------   --------------   SCRIPT ANSWERS   Relationship to Patient?  Self

## 2022-08-26 NOTE — TELEPHONE ENCOUNTER
Patient was last seen in office on 2/8/22 for chronic pain no a follow up plan noted. Medication was sent in on 8/15/22 30 with 3 refills. Called patient to let him know, patient has been notified to call his pharmacy.

## 2022-08-30 ENCOUNTER — HOSPITAL ENCOUNTER (OUTPATIENT)
Dept: LAB | Age: 37
Discharge: HOME | End: 2022-08-30
Payer: COMMERCIAL

## 2022-08-30 ENCOUNTER — OFFICE VISIT (OUTPATIENT)
Dept: FAMILY MEDICINE CLINIC | Age: 37
End: 2022-08-30
Payer: COMMERCIAL

## 2022-08-30 VITALS
OXYGEN SATURATION: 99 % | HEART RATE: 72 BPM | RESPIRATION RATE: 10 BRPM | WEIGHT: 160 LBS | SYSTOLIC BLOOD PRESSURE: 130 MMHG | DIASTOLIC BLOOD PRESSURE: 84 MMHG | TEMPERATURE: 98.1 F | HEIGHT: 72 IN | BODY MASS INDEX: 21.67 KG/M2

## 2022-08-30 DIAGNOSIS — M54.2 NECK PAIN: ICD-10-CM

## 2022-08-30 DIAGNOSIS — I10 ESSENTIAL HYPERTENSION: ICD-10-CM

## 2022-08-30 DIAGNOSIS — Z01.818 PRE-OP EXAMINATION: Primary | ICD-10-CM

## 2022-08-30 DIAGNOSIS — Z01.818 PRE-OP EXAMINATION: ICD-10-CM

## 2022-08-30 LAB
ALBUMIN SERPL-MCNC: 4.1 G/DL (ref 3.4–5)
ALBUMIN/GLOB SERPL: 1.3 {RATIO} (ref 0.8–1.7)
ALP SERPL-CCNC: 77 U/L (ref 45–117)
ALT SERPL-CCNC: 16 U/L (ref 16–61)
ANION GAP SERPL CALC-SCNC: 10 MMOL/L (ref 3–18)
AST SERPL-CCNC: 34 U/L (ref 10–38)
BASOPHILS # BLD: 0.1 K/UL (ref 0–0.1)
BASOPHILS NFR BLD: 1 % (ref 0–2)
BILIRUB SERPL-MCNC: 0.6 MG/DL (ref 0.2–1)
BUN SERPL-MCNC: 12 MG/DL (ref 7–18)
BUN/CREAT SERPL: 13 (ref 12–20)
CALCIUM SERPL-MCNC: 9.1 MG/DL (ref 8.5–10.1)
CHLORIDE SERPL-SCNC: 113 MMOL/L (ref 100–111)
CO2 SERPL-SCNC: 16 MMOL/L (ref 21–32)
CREAT SERPL-MCNC: 0.94 MG/DL (ref 0.6–1.3)
DIFFERENTIAL METHOD BLD: ABNORMAL
EOSINOPHIL # BLD: 0.4 K/UL (ref 0–0.4)
EOSINOPHIL NFR BLD: 7 % (ref 0–5)
ERYTHROCYTE [DISTWIDTH] IN BLOOD BY AUTOMATED COUNT: 12.8 % (ref 11.6–14.5)
GLOBULIN SER CALC-MCNC: 3.1 G/DL (ref 2–4)
GLUCOSE SERPL-MCNC: 132 MG/DL (ref 74–99)
HCT VFR BLD AUTO: 47.4 % (ref 36–48)
HGB BLD-MCNC: 15.4 G/DL (ref 13–16)
IMM GRANULOCYTES # BLD AUTO: 0 K/UL (ref 0–0.04)
IMM GRANULOCYTES NFR BLD AUTO: 0 % (ref 0–0.5)
LYMPHOCYTES # BLD: 1.8 K/UL (ref 0.9–3.6)
LYMPHOCYTES NFR BLD: 29 % (ref 21–52)
MCH RBC QN AUTO: 30.9 PG (ref 24–34)
MCHC RBC AUTO-ENTMCNC: 32.5 G/DL (ref 31–37)
MCV RBC AUTO: 95 FL (ref 78–100)
MONOCYTES # BLD: 0.5 K/UL (ref 0.05–1.2)
MONOCYTES NFR BLD: 9 % (ref 3–10)
NEUTS SEG # BLD: 3.3 K/UL (ref 1.8–8)
NEUTS SEG NFR BLD: 54 % (ref 40–73)
NRBC # BLD: 0 K/UL (ref 0–0.01)
NRBC BLD-RTO: 0 PER 100 WBC
PLATELET # BLD AUTO: 248 K/UL (ref 135–420)
PMV BLD AUTO: 11.1 FL (ref 9.2–11.8)
POTASSIUM SERPL-SCNC: 5.3 MMOL/L (ref 3.5–5.5)
PROT SERPL-MCNC: 7.2 G/DL (ref 6.4–8.2)
RBC # BLD AUTO: 4.99 M/UL (ref 4.35–5.65)
SODIUM SERPL-SCNC: 139 MMOL/L (ref 136–145)
WBC # BLD AUTO: 6.1 K/UL (ref 4.6–13.2)

## 2022-08-30 PROCEDURE — 36415 COLL VENOUS BLD VENIPUNCTURE: CPT

## 2022-08-30 PROCEDURE — 85025 COMPLETE CBC W/AUTO DIFF WBC: CPT

## 2022-08-30 PROCEDURE — 99395 PREV VISIT EST AGE 18-39: CPT | Performed by: FAMILY MEDICINE

## 2022-08-30 PROCEDURE — 80053 COMPREHEN METABOLIC PANEL: CPT

## 2022-08-30 NOTE — PROGRESS NOTES
Raul Ceja is a 40 y.o. male  presents for pre op for neck surgery. No chest pain or SOB    Allergies   Allergen Reactions    Adhesive Contact Dermatitis    Amoxicillin Other (comments)    Demerol [Meperidine] Hives and Swelling    Hydrocodone-Acetaminophen Nausea Only    Ketorolac Nausea Only    Lidocaine-Silicone, Adhesive Contact Dermatitis    Pcn [Penicillins] Hives    Sulfa (Sulfonamide Antibiotics) Hives    Tramadol Other (comments)     Outpatient Medications Marked as Taking for the 8/30/22 encounter (Office Visit) with Javier Barrios MD   Medication Sig Dispense Refill    pantoprazole (PROTONIX) 40 mg tablet take 1 tablet by mouth once daily 30 Tablet 3    cyclobenzaprine (FLEXERIL) 10 mg tablet Take 1 Tablet by mouth three (3) times daily as needed for Muscle Spasm(s). 40 Tablet 0    raNITIdine (ZANTAC) 150 mg tablet Take 1 Tablet by mouth two (2) times a day. 60 Tablet 1    pregabalin (LYRICA) 100 mg capsule Take  by mouth two (2) times a day. [DISCONTINUED] metoprolol succinate (TOPROL-XL) 25 mg XL tablet Take 1 Tablet by mouth daily. Indications: high blood pressure 90 Tablet 0    [DISCONTINUED] ondansetron (ZOFRAN ODT) 4 mg disintegrating tablet Take 4 mg by mouth. [DISCONTINUED] albuterol (PROVENTIL HFA, VENTOLIN HFA, PROAIR HFA) 90 mcg/actuation inhaler Take 2 Puffs by inhalation every four (4) hours as needed for Wheezing. 1 Inhaler 0    [DISCONTINUED] oxyCODONE ER (OXYCONTIN) 20 mg ER tablet   0    [DISCONTINUED] cetirizine (ZYRTEC) 10 mg tablet Take  by mouth daily.        Patient Active Problem List   Diagnosis Code    LBP (low back pain) M54.50    Encounter for long-term (current) use of other medications Z79.899    Radiculitis M54.10    Intervertebral disc extrusion [id number]    ADHD (attention deficit hyperactivity disorder) F90.9    Marijuana use F12.90    Abnormal bruising R23.8    Colitis K52.9    Kelsea-Schulte tear K22.6    Chronic low back pain M54.50, G89.29 Essential hypertension I10    Annual physical exam Z00.00    Chronic pain syndrome G89.4     Past Medical History:   Diagnosis Date    Acid indigestion     ADHD (attention deficit hyperactivity disorder)     Asthma     Back pain     Heartburn     Herpes     HTN (hypertension)     IBS (irritable bowel syndrome)     Joint pain     Muscle pain     Muscle weakness     Trouble in sleeping      Social History     Socioeconomic History    Marital status: SINGLE   Tobacco Use    Smoking status: Every Day     Packs/day: 0.50     Years: 13.00     Pack years: 6.50     Types: Cigarettes    Smokeless tobacco: Never   Vaping Use    Vaping Use: Never used   Substance and Sexual Activity    Alcohol use: Yes     Alcohol/week: 1.7 standard drinks     Types: 2 Standard drinks or equivalent per week     Comment: rare    Drug use: Yes     Types: Marijuana, Prescription, OTC    Sexual activity: Never     Family History   Problem Relation Age of Onset    Cancer Other         GRANDMOTHER, AND AUNT    Diabetes Other         GREAT GRANDFATHER    Heart Disease Other         AUNT    Hypertension Other         GRANDMOTHER, GRANDFATHER, MOTHER    Stroke Other     Immunodeficiency Other         ALL FAMILY MEMBERS        Review of Systems   Constitutional:  Negative for chills, fever, malaise/fatigue and weight loss. Eyes:  Negative for blurred vision. Respiratory:  Negative for cough, shortness of breath and wheezing. Cardiovascular:  Negative for chest pain. Gastrointestinal:  Negative for nausea and vomiting. Musculoskeletal:  Positive for back pain and neck pain. Negative for myalgias. Skin:  Negative for rash. Neurological:  Negative for weakness. Vitals:    08/30/22 1305   BP: 130/84   Pulse: 72   Resp: 10   Temp: 98.1 °F (36.7 °C)   SpO2: 99%   Weight: 160 lb (72.6 kg)   Height: 6' (1.829 m)   PainSc:   0 - No pain       Physical Exam  Vitals and nursing note reviewed.    Constitutional:       Appearance: Normal appearance. He is normal weight. HENT:      Mouth/Throat:      Mouth: Mucous membranes are moist.      Pharynx: Oropharynx is clear. Eyes:      Extraocular Movements: Extraocular movements intact. Conjunctiva/sclera: Conjunctivae normal.      Pupils: Pupils are equal, round, and reactive to light. Cardiovascular:      Rate and Rhythm: Normal rate and regular rhythm. Pulses: Normal pulses. Heart sounds: Normal heart sounds. Pulmonary:      Effort: Pulmonary effort is normal.      Breath sounds: Normal breath sounds. Musculoskeletal:         General: Normal range of motion. Cervical back: Normal range of motion and neck supple. Skin:     General: Skin is warm and dry. Capillary Refill: Capillary refill takes less than 2 seconds. Neurological:      General: No focal deficit present. Mental Status: He is alert and oriented to person, place, and time. Psychiatric:         Mood and Affect: Mood normal.         Behavior: Behavior normal.         Thought Content: Thought content normal.         Judgment: Judgment normal.     Assessment/Plan      ICD-10-CM ICD-9-CM    1. Pre-op examination  Z01.818 V72.84 CBC WITH AUTOMATED DIFF      METABOLIC PANEL, COMPREHENSIVE      2. Essential hypertension  I10 401.9       3. Neck pain  M54.2 723.1         Will continue protonix and flexeril    I have discussed the diagnosis with the patient and the intended plan of care as seen in the above orders. The patient has received an after-visit summary and questions were answered concerning future plans. I have discussed medication, side effects, and warnings with the patient in detail. The patient verbalized understanding and is in agreement with the plan of care. The patient will contact the office with any additional concerns.       lab results and schedule of future lab studies reviewed with patient    Inga Al MD

## 2022-08-30 NOTE — PROGRESS NOTES
Chief Complaint   Patient presents with    Pre-op Exam     Neck surgery       Pt in office for pre op exam. He is having neck surgery on 10/12 by Dr. Sangita Bonilla. Denies any issues with latex or anesthesia. States that some adhesives 'eat away at his skin'.

## 2022-09-30 ENCOUNTER — TELEPHONE (OUTPATIENT)
Dept: FAMILY MEDICINE CLINIC | Age: 37
End: 2022-09-30

## 2022-09-30 NOTE — LETTER
9/30/2022 1:24 PM    Mr. Yazmin [place] [address] [address]        Patient is currently under the care of Dr. Ilda Mejia at [facility]. After reviewing patient's most recent chart notes and labs, patient is cleared for surgery. If you have any questions, please contact the office at [phone].       Sincerely,      Cierra Lion MD

## 2023-01-16 DIAGNOSIS — K21.9 GASTROESOPHAGEAL REFLUX DISEASE WITHOUT ESOPHAGITIS: ICD-10-CM

## 2023-01-16 NOTE — TELEPHONE ENCOUNTER
This patient contacted office for the following prescriptions to be filled:    Last office visit: 8/30/22  Follow up appointment: no upcoming appt (if overdue call patient to schedule appointment)  Medication requested :   Requested Prescriptions     Pending Prescriptions Disp Refills    pantoprazole (PROTONIX) 40 mg tablet 30 Tablet 3     Sig: Take 1 Tablet by mouth daily. PCP: [facility]  Mail order or Local pharmacy name [facility]      Pt is aj out of the medication listed above.  Please review and advise as soon as possiblel

## 2023-01-17 RX ORDER — PANTOPRAZOLE SODIUM 40 MG/1
40 TABLET, DELAYED RELEASE ORAL DAILY
Qty: 30 TABLET | Refills: 3 | Status: SHIPPED | OUTPATIENT
Start: 2023-01-17

## 2023-05-19 RX ORDER — PANTOPRAZOLE SODIUM 40 MG/1
TABLET, DELAYED RELEASE ORAL
Qty: 30 TABLET | Refills: 0 | Status: SHIPPED | OUTPATIENT
Start: 2023-05-19

## 2023-05-19 NOTE — TELEPHONE ENCOUNTER
This pharmacy faxed over request for the following prescriptions to be filled:    Medication requested :   Requested Prescriptions     Pending Prescriptions Disp Refills    pantoprazole (PROTONIX) 40 MG tablet [Pharmacy Med Name: PANTOPRAZOLE SOD DR 40 MG TAB] 30 tablet      Sig: take 1 tablet by mouth once daily      PCP: Dr. Yung Bread or Print: [phone]  Mail order or Local pharmacy: [phone]    Scheduled appointment if not seen by current providers in office: LOV  8/30/22, no follow up scheduled, overdue      I called and l/m asking for pt to call to [facility] follow up.

## 2023-06-30 RX ORDER — PANTOPRAZOLE SODIUM 40 MG/1
40 TABLET, DELAYED RELEASE ORAL DAILY
Qty: 30 TABLET | Refills: 0 | Status: SHIPPED | OUTPATIENT
Start: 2023-06-30

## 2023-07-20 ENCOUNTER — OFFICE VISIT (OUTPATIENT)
Facility: CLINIC | Age: 38
End: 2023-07-20
Payer: COMMERCIAL

## 2023-07-20 VITALS
BODY MASS INDEX: 20.89 KG/M2 | WEIGHT: 154 LBS | OXYGEN SATURATION: 99 % | DIASTOLIC BLOOD PRESSURE: 82 MMHG | HEART RATE: 75 BPM | TEMPERATURE: 97.5 F | SYSTOLIC BLOOD PRESSURE: 134 MMHG

## 2023-07-20 DIAGNOSIS — F90.0 ATTENTION-DEFICIT HYPERACTIVITY DISORDER, PREDOMINANTLY INATTENTIVE TYPE: ICD-10-CM

## 2023-07-20 DIAGNOSIS — G89.4 CHRONIC PAIN SYNDROME: ICD-10-CM

## 2023-07-20 DIAGNOSIS — K22.6 GASTRO-ESOPHAGEAL LACERATION-HEMORRHAGE SYNDROME: Primary | ICD-10-CM

## 2023-07-20 PROCEDURE — 99214 OFFICE O/P EST MOD 30 MIN: CPT | Performed by: FAMILY MEDICINE

## 2023-07-20 PROCEDURE — 3079F DIAST BP 80-89 MM HG: CPT | Performed by: FAMILY MEDICINE

## 2023-07-20 PROCEDURE — 3075F SYST BP GE 130 - 139MM HG: CPT | Performed by: FAMILY MEDICINE

## 2023-07-20 RX ORDER — PANTOPRAZOLE SODIUM 40 MG/1
40 TABLET, DELAYED RELEASE ORAL DAILY
Qty: 90 TABLET | Refills: 3 | Status: SHIPPED | OUTPATIENT
Start: 2023-07-20

## 2023-07-20 SDOH — ECONOMIC STABILITY: HOUSING INSECURITY
IN THE LAST 12 MONTHS, WAS THERE A TIME WHEN YOU DID NOT HAVE A STEADY PLACE TO SLEEP OR SLEPT IN A SHELTER (INCLUDING NOW)?: NO

## 2023-07-20 SDOH — ECONOMIC STABILITY: FOOD INSECURITY: WITHIN THE PAST 12 MONTHS, THE FOOD YOU BOUGHT JUST DIDN'T LAST AND YOU DIDN'T HAVE MONEY TO GET MORE.: NEVER TRUE

## 2023-07-20 SDOH — ECONOMIC STABILITY: INCOME INSECURITY: HOW HARD IS IT FOR YOU TO PAY FOR THE VERY BASICS LIKE FOOD, HOUSING, MEDICAL CARE, AND HEATING?: NOT VERY HARD

## 2023-07-20 SDOH — ECONOMIC STABILITY: FOOD INSECURITY: WITHIN THE PAST 12 MONTHS, YOU WORRIED THAT YOUR FOOD WOULD RUN OUT BEFORE YOU GOT MONEY TO BUY MORE.: SOMETIMES TRUE

## 2023-07-20 ASSESSMENT — PATIENT HEALTH QUESTIONNAIRE - PHQ9
SUM OF ALL RESPONSES TO PHQ QUESTIONS 1-9: 12
10. IF YOU CHECKED OFF ANY PROBLEMS, HOW DIFFICULT HAVE THESE PROBLEMS MADE IT FOR YOU TO DO YOUR WORK, TAKE CARE OF THINGS AT HOME, OR GET ALONG WITH OTHER PEOPLE: 1
1. LITTLE INTEREST OR PLEASURE IN DOING THINGS: 2
8. MOVING OR SPEAKING SO SLOWLY THAT OTHER PEOPLE COULD HAVE NOTICED. OR THE OPPOSITE, BEING SO FIGETY OR RESTLESS THAT YOU HAVE BEEN MOVING AROUND A LOT MORE THAN USUAL: 2
4. FEELING TIRED OR HAVING LITTLE ENERGY: 0
5. POOR APPETITE OR OVEREATING: 0
7. TROUBLE CONCENTRATING ON THINGS, SUCH AS READING THE NEWSPAPER OR WATCHING TELEVISION: 2
9. THOUGHTS THAT YOU WOULD BE BETTER OFF DEAD, OR OF HURTING YOURSELF: 0
2. FEELING DOWN, DEPRESSED OR HOPELESS: 2
SUM OF ALL RESPONSES TO PHQ QUESTIONS 1-9: 12
6. FEELING BAD ABOUT YOURSELF - OR THAT YOU ARE A FAILURE OR HAVE LET YOURSELF OR YOUR FAMILY DOWN: 1
SUM OF ALL RESPONSES TO PHQ QUESTIONS 1-9: 12
SUM OF ALL RESPONSES TO PHQ QUESTIONS 1-9: 12
SUM OF ALL RESPONSES TO PHQ9 QUESTIONS 1 & 2: 4
3. TROUBLE FALLING OR STAYING ASLEEP: 3

## 2023-07-20 ASSESSMENT — ENCOUNTER SYMPTOMS
VOMITING: 0
RESPIRATORY NEGATIVE: 1
COUGH: 0
NAUSEA: 0

## 2023-07-20 NOTE — PROGRESS NOTES
David Rodrigez is a 41 y.o. male  presents for   Chief Complaint   Patient presents with    Gastroesophageal Reflux    Chronic pain and ADHD  no ideas of suicide or homicide    Allergies   Allergen Reactions    Adhesive Tape Dermatitis    Hydrocodone-Acetaminophen Nausea Only    Ketorolac Nausea Only    Meperidine Hives and Swelling    Penicillins Hives and Other (See Comments)    Sulfa Antibiotics Hives    Tramadol Other (See Comments)       Current Outpatient Medications:     pantoprazole (PROTONIX) 40 MG tablet, Take 1 tablet by mouth daily, Disp: 90 tablet, Rfl: 3    cyclobenzaprine (FLEXERIL) 10 MG tablet, Take 1 tablet by mouth 3 times daily as needed, Disp: , Rfl:     pregabalin (LYRICA) 100 MG capsule, Take by mouth 2 times daily. , Disp: , Rfl:     raNITIdine (ZANTAC) 150 MG tablet, Take 1 tablet by mouth 2 times daily, Disp: , Rfl:    Patient Active Problem List   Diagnosis    Radiculitis    ADHD (attention deficit hyperactivity disorder)    Encounter for long-term (current) use of other medications    Marijuana use    Khloe-Duggan tear    Chronic low back pain    Essential hypertension    Annual physical exam    Chronic pain syndrome    Colitis    Abnormal bruising     Past Medical History:   Diagnosis Date    Acid indigestion     ADHD (attention deficit hyperactivity disorder)     Asthma     Back pain     Heartburn     Herpes     HTN (hypertension)     IBS (irritable bowel syndrome)     Joint pain     Muscle pain     Muscle weakness     Trouble in sleeping      Social History     Socioeconomic History    Marital status: Single     Spouse name: None    Number of children: None    Years of education: None    Highest education level: None   Tobacco Use    Smoking status: Every Day     Packs/day: 0.50     Types: Cigarettes    Smokeless tobacco: Never   Substance and Sexual Activity    Alcohol use:  Yes     Alcohol/week: 1.7 standard drinks    Drug use: Yes     Types: Prescription, Marijuana Arzella Skill), OTC

## 2023-07-20 NOTE — PROGRESS NOTES
Chief Complaint   Patient presents with    Gastroesophageal Reflux     Patient here for f/u and would like to discuss some referrals. 1. \"Have you been to the ER, urgent care clinic since your last visit? Hospitalized since your last visit? \"  Seen at [facility] ER \"a few times\"    2. \"Have you seen or consulted any other health care providers outside of the [address] [place] [address] since your last visit? \"  Has seen [facility]      3. For patients aged 43-73: Has the patient had a colonoscopy / FIT/ Cologuard? NA - based on age      If the patient is female:    4. For patients aged 43-66: Has the patient had a mammogram within the past 2 years? NA - based on age or sex      11. For patients aged 21-65: Has the patient had a pap smear?  NA - based on age or sex

## 2023-08-17 ENCOUNTER — TELEPHONE (OUTPATIENT)
Age: 38
End: 2023-08-17

## 2023-08-17 NOTE — TELEPHONE ENCOUNTER
Left voicemail message for Mr. Arora to contact our office regarding referral received from PCP for chronic pain syndrome. I called PCP to verify diagnosis however  states Dr. [place]OCK [place] BREEZY [facility] is not in the office and he talked to co-worker who think the diagnosis for the referral was not correct. Their office asked if we see patients for wound care. I explained we see patients for surgical needs such as if there's a need for surgical intervention for abscess/mass etc...we routinely refer patients to wound care. Dr. [place] BREEZ[place] [facility] office will get clarification on the referral for Mr. Arora.

## 2023-08-18 ENCOUNTER — TELEPHONE (OUTPATIENT)
Age: 38
End: 2023-08-18

## 2023-08-18 NOTE — TELEPHONE ENCOUNTER
Ti Alon called inquiring about the status of his appointment/referred by Dr. Maria L Weems. I explained I spoke to his PCP office yesterday and I was told they will confirm with Dr. Richar Brunson if this referral is correct re: diagnosis referred for chronic pain to general surgery practice.

## 2023-10-03 ENCOUNTER — OFFICE VISIT (OUTPATIENT)
Age: 38
End: 2023-10-03
Payer: COMMERCIAL

## 2023-10-03 VITALS
SYSTOLIC BLOOD PRESSURE: 154 MMHG | HEART RATE: 72 BPM | WEIGHT: 157 LBS | TEMPERATURE: 97.7 F | OXYGEN SATURATION: 99 % | HEIGHT: 72 IN | BODY MASS INDEX: 21.26 KG/M2 | DIASTOLIC BLOOD PRESSURE: 100 MMHG | RESPIRATION RATE: 18 BRPM

## 2023-10-03 DIAGNOSIS — G89.29 CHRONIC LOW BACK PAIN, UNSPECIFIED BACK PAIN LATERALITY, UNSPECIFIED WHETHER SCIATICA PRESENT: ICD-10-CM

## 2023-10-03 DIAGNOSIS — K21.9 GASTROESOPHAGEAL REFLUX DISEASE, UNSPECIFIED WHETHER ESOPHAGITIS PRESENT: ICD-10-CM

## 2023-10-03 DIAGNOSIS — K21.9 GASTROESOPHAGEAL REFLUX DISEASE, UNSPECIFIED WHETHER ESOPHAGITIS PRESENT: Primary | ICD-10-CM

## 2023-10-03 DIAGNOSIS — M54.50 CHRONIC LOW BACK PAIN, UNSPECIFIED BACK PAIN LATERALITY, UNSPECIFIED WHETHER SCIATICA PRESENT: ICD-10-CM

## 2023-10-03 PROCEDURE — 3080F DIAST BP >= 90 MM HG: CPT | Performed by: STUDENT IN AN ORGANIZED HEALTH CARE EDUCATION/TRAINING PROGRAM

## 2023-10-03 PROCEDURE — 99205 OFFICE O/P NEW HI 60 MIN: CPT | Performed by: STUDENT IN AN ORGANIZED HEALTH CARE EDUCATION/TRAINING PROGRAM

## 2023-10-03 PROCEDURE — 3077F SYST BP >= 140 MM HG: CPT | Performed by: STUDENT IN AN ORGANIZED HEALTH CARE EDUCATION/TRAINING PROGRAM

## 2023-10-03 NOTE — PROGRESS NOTES
General Surgery Initial Consult    Patient: Marika Brown MRN: [id number]  SSN: [id number]    YOB: 1985  Age: 41 y.o. Sex: male      Subjective:      CC: Abdominal pain    Marika Brown is a 41 y.o. male who is being seen for initial evaluation for abdominal pain, and chronic nausea/vomiting and hyperemesis. His past medical history is notable for an admission in [facility] in 2019 for an upper GI bleed secondary to a 6 mm Khloe-Duggan tear that was treated endoscopically. His past history is also notable for chronic pain secondary to multiple lumbar and cervical procedures and also history of inflammatory bowel disease, ulcerative colitis per his past records, without active GI follow-up or maintenance therapy. He now notes some new onset epigastric pain over the last 1 to 2 months. He notes that this new pain is postprandial and occurs after almost all meals. It would last roughly 1 to 2 hours. He does not notice a difference between different foods. He notes the pain is mostly postprandial and denies any of this type of pain in between meals. Denies any fever/chills, fever, recent weight loss or weight gain, jaundice. He has a longstanding history of GERD, with typical symptoms including heartburn and regurgitation. He has been told he has a small hiatal hernia in the past, though that is not noted on the EGD from 2019 I had access to. He has had issues with nausea, vomiting, and hyperemesis for some time. He has had multiple prior ED and inpatient admissions for difficulties with this, most recently December 2022. The exact etiology of this is unknown. He does use marijuana daily as a pain control adjunct. He does have history of inflammatory bowel disease and has been told that he has ulcerative colitis in the past, though I do not see any GI notes or colonoscopy reports to confirm that.   He notes chronic loose stools with occasional fresh blood and

## 2023-10-25 ENCOUNTER — TELEPHONE (OUTPATIENT)
Age: 38
End: 2023-10-25

## 2023-10-25 NOTE — TELEPHONE ENCOUNTER
Pt stated the referral sent over to pain management does not take his insurance.  Would like a different referral.

## 2023-12-07 ENCOUNTER — TELEPHONE (OUTPATIENT)
Age: 38
End: 2023-12-07

## 2023-12-07 DIAGNOSIS — G89.29 OTHER CHRONIC PAIN: Primary | ICD-10-CM

## 2023-12-07 NOTE — TELEPHONE ENCOUNTER
Patient called in stating the referral for pain management did not go through and would like one sent to [facility] as they accept his insurance.

## 2024-01-23 ENCOUNTER — OFFICE VISIT (OUTPATIENT)
Facility: CLINIC | Age: 39
End: 2024-01-23
Payer: MEDICAID

## 2024-01-23 VITALS
DIASTOLIC BLOOD PRESSURE: 86 MMHG | SYSTOLIC BLOOD PRESSURE: 128 MMHG | HEIGHT: 72 IN | BODY MASS INDEX: 21.32 KG/M2 | OXYGEN SATURATION: 99 % | HEART RATE: 75 BPM | WEIGHT: 157.4 LBS

## 2024-01-23 DIAGNOSIS — K22.6 GASTRO-ESOPHAGEAL LACERATION-HEMORRHAGE SYNDROME: Primary | ICD-10-CM

## 2024-01-23 DIAGNOSIS — L98.9 SKIN LESION: ICD-10-CM

## 2024-01-23 DIAGNOSIS — G89.4 CHRONIC PAIN SYNDROME: ICD-10-CM

## 2024-01-23 PROCEDURE — 99213 OFFICE O/P EST LOW 20 MIN: CPT | Performed by: FAMILY MEDICINE

## 2024-01-23 PROCEDURE — 3074F SYST BP LT 130 MM HG: CPT | Performed by: FAMILY MEDICINE

## 2024-01-23 PROCEDURE — 3079F DIAST BP 80-89 MM HG: CPT | Performed by: FAMILY MEDICINE

## 2024-01-23 RX ORDER — PANTOPRAZOLE SODIUM 40 MG/1
40 TABLET, DELAYED RELEASE ORAL
Qty: 90 TABLET | Refills: 3 | Status: SHIPPED | OUTPATIENT
Start: 2024-01-23

## 2024-01-23 ASSESSMENT — ENCOUNTER SYMPTOMS
COUGH: 0
NAUSEA: 0
BACK PAIN: 1
RESPIRATORY NEGATIVE: 1
VOMITING: 0

## 2024-01-23 ASSESSMENT — PATIENT HEALTH QUESTIONNAIRE - PHQ9
8. MOVING OR SPEAKING SO SLOWLY THAT OTHER PEOPLE COULD HAVE NOTICED. OR THE OPPOSITE, BEING SO FIGETY OR RESTLESS THAT YOU HAVE BEEN MOVING AROUND A LOT MORE THAN USUAL: 0
9. THOUGHTS THAT YOU WOULD BE BETTER OFF DEAD, OR OF HURTING YOURSELF: 0
SUM OF ALL RESPONSES TO PHQ9 QUESTIONS 1 & 2: 0
SUM OF ALL RESPONSES TO PHQ QUESTIONS 1-9: 0
SUM OF ALL RESPONSES TO PHQ QUESTIONS 1-9: 0
3. TROUBLE FALLING OR STAYING ASLEEP: 0
6. FEELING BAD ABOUT YOURSELF - OR THAT YOU ARE A FAILURE OR HAVE LET YOURSELF OR YOUR FAMILY DOWN: 0
10. IF YOU CHECKED OFF ANY PROBLEMS, HOW DIFFICULT HAVE THESE PROBLEMS MADE IT FOR YOU TO DO YOUR WORK, TAKE CARE OF THINGS AT HOME, OR GET ALONG WITH OTHER PEOPLE: 0
2. FEELING DOWN, DEPRESSED OR HOPELESS: 0
7. TROUBLE CONCENTRATING ON THINGS, SUCH AS READING THE NEWSPAPER OR WATCHING TELEVISION: 0
SUM OF ALL RESPONSES TO PHQ QUESTIONS 1-9: 0
SUM OF ALL RESPONSES TO PHQ QUESTIONS 1-9: 0
5. POOR APPETITE OR OVEREATING: 0
4. FEELING TIRED OR HAVING LITTLE ENERGY: 0
1. LITTLE INTEREST OR PLEASURE IN DOING THINGS: 0

## 2024-01-23 NOTE — PROGRESS NOTES
Radhames Arora is a 38 y.o. male  presents for   Chief Complaint   Patient presents with    Follow-up        Allergies   Allergen Reactions    Adhesive Tape Dermatitis    Hydrocodone-Acetaminophen Nausea Only    Ketorolac Nausea Only    Meperidine Hives and Swelling    Penicillins Hives and Other (See Comments)    Sulfa Antibiotics Hives    Tramadol Other (See Comments)       Current Outpatient Medications:     pantoprazole (PROTONIX) 40 MG tablet, Take 1 tablet by mouth daily, Disp: 90 tablet, Rfl: 3    raNITIdine (ZANTAC) 150 MG tablet, Take 1 tablet by mouth 2 times daily, Disp: , Rfl:     cyclobenzaprine (FLEXERIL) 10 MG tablet, Take 1 tablet by mouth 3 times daily as needed (Patient not taking: Reported on 1/23/2024), Disp: , Rfl:     pregabalin (LYRICA) 100 MG capsule, Take by mouth 2 times daily. (Patient not taking: Reported on 1/23/2024), Disp: , Rfl:    Patient Active Problem List   Diagnosis    Radiculitis    ADHD (attention deficit hyperactivity disorder)    Encounter for long-term (current) use of other medications    Marijuana use    Khloe-Duggan tear    Chronic low back pain    Essential hypertension    Annual physical exam    Chronic pain syndrome    Colitis    Abnormal bruising     Past Medical History:   Diagnosis Date    Acid indigestion     ADHD (attention deficit hyperactivity disorder)     Asthma     Back pain     Heartburn     Herpes     HTN (hypertension)     IBS (irritable bowel syndrome)     Joint pain     Muscle pain     Muscle weakness     Trouble in sleeping      Social History     Socioeconomic History    Marital status: Single   Tobacco Use    Smoking status: Every Day     Current packs/day: 0.50     Types: Cigarettes    Smokeless tobacco: Never   Substance and Sexual Activity    Alcohol use: Yes     Alcohol/week: 1.7 standard drinks of alcohol    Drug use: Yes     Types: Prescription, Marijuana (Weed), OTC     Social Determinants of Health     Financial Resource Strain: Low

## 2024-03-14 ENCOUNTER — TELEPHONE (OUTPATIENT)
Facility: CLINIC | Age: 39
End: 2024-03-14

## 2024-03-14 NOTE — TELEPHONE ENCOUNTER
Pt called stating his mom passed a week ago and he didn't take that so well and he hurt his hand. Pt states his knuckels are swollen and he is having numbness and tingling in his hand. I advise Mr. Arora to go to urgent care or the Emergency room to get his had X-rayed to make sure nothing is broken. Pt states he understand.